# Patient Record
Sex: FEMALE | Race: BLACK OR AFRICAN AMERICAN | Employment: OTHER | URBAN - METROPOLITAN AREA
[De-identification: names, ages, dates, MRNs, and addresses within clinical notes are randomized per-mention and may not be internally consistent; named-entity substitution may affect disease eponyms.]

---

## 2017-01-16 RX ORDER — AMLODIPINE BESYLATE 10 MG/1
TABLET ORAL
Qty: 90 TABLET | Refills: 0 | Status: SHIPPED | OUTPATIENT
Start: 2017-01-16 | End: 2017-05-05 | Stop reason: SDUPTHER

## 2017-01-24 ENCOUNTER — NURSE ONLY (OUTPATIENT)
Dept: FAMILY MEDICINE CLINIC | Age: 57
End: 2017-01-24

## 2017-01-24 VITALS — SYSTOLIC BLOOD PRESSURE: 140 MMHG | DIASTOLIC BLOOD PRESSURE: 60 MMHG

## 2017-01-24 DIAGNOSIS — I10 HYPERTENSION, BENIGN: Primary | ICD-10-CM

## 2017-01-30 RX ORDER — ATORVASTATIN CALCIUM 10 MG/1
TABLET, FILM COATED ORAL
Qty: 90 TABLET | Refills: 0 | Status: SHIPPED | OUTPATIENT
Start: 2017-01-30 | End: 2017-05-05 | Stop reason: SDUPTHER

## 2017-03-06 RX ORDER — POTASSIUM CHLORIDE 600 MG/1
CAPSULE, EXTENDED RELEASE ORAL
Qty: 60 CAPSULE | Refills: 6 | Status: SHIPPED | OUTPATIENT
Start: 2017-03-06 | End: 2017-10-14 | Stop reason: SDUPTHER

## 2017-05-08 RX ORDER — AMLODIPINE BESYLATE 10 MG/1
TABLET ORAL
Qty: 90 TABLET | Refills: 0 | Status: SHIPPED | OUTPATIENT
Start: 2017-05-08 | End: 2017-08-08 | Stop reason: SDUPTHER

## 2017-05-08 RX ORDER — ATORVASTATIN CALCIUM 10 MG/1
TABLET, FILM COATED ORAL
Qty: 90 TABLET | Refills: 0 | Status: SHIPPED | OUTPATIENT
Start: 2017-05-08 | End: 2017-08-08 | Stop reason: SDUPTHER

## 2017-06-19 RX ORDER — EMPAGLIFLOZIN 10 MG/1
TABLET, FILM COATED ORAL
Qty: 30 TABLET | Refills: 0 | Status: SHIPPED | OUTPATIENT
Start: 2017-06-19 | End: 2017-06-19 | Stop reason: SDUPTHER

## 2017-06-26 RX ORDER — PEN NEEDLE, DIABETIC 32GX 5/32"
NEEDLE, DISPOSABLE MISCELLANEOUS
Qty: 100 PACKAGE | Refills: 1 | Status: SHIPPED | OUTPATIENT
Start: 2017-06-26 | End: 2022-09-09

## 2017-06-27 ENCOUNTER — OFFICE VISIT (OUTPATIENT)
Dept: FAMILY MEDICINE CLINIC | Age: 57
End: 2017-06-27

## 2017-06-27 VITALS
HEART RATE: 78 BPM | DIASTOLIC BLOOD PRESSURE: 90 MMHG | BODY MASS INDEX: 32.4 KG/M2 | HEIGHT: 66 IN | SYSTOLIC BLOOD PRESSURE: 122 MMHG | WEIGHT: 201.6 LBS

## 2017-06-27 DIAGNOSIS — E78.2 MIXED HYPERLIPIDEMIA: ICD-10-CM

## 2017-06-27 DIAGNOSIS — I10 HYPERTENSION, BENIGN: ICD-10-CM

## 2017-06-27 DIAGNOSIS — R79.89 ABNORMAL LFTS: ICD-10-CM

## 2017-06-27 DIAGNOSIS — E11.9 TYPE 2 DIABETES MELLITUS WITHOUT COMPLICATION, WITHOUT LONG-TERM CURRENT USE OF INSULIN (HCC): Primary | ICD-10-CM

## 2017-06-27 LAB
HCT VFR BLD CALC: 46.8 % (ref 36–48)
HEMOGLOBIN: 15.3 G/DL (ref 12–16)
MCH RBC QN AUTO: 31.8 PG (ref 26–34)
MCHC RBC AUTO-ENTMCNC: 32.8 G/DL (ref 31–36)
MCV RBC AUTO: 96.9 FL (ref 80–100)
PDW BLD-RTO: 12.7 % (ref 12.4–15.4)
PLATELET # BLD: 186 K/UL (ref 135–450)
PMV BLD AUTO: 9.8 FL (ref 5–10.5)
RBC # BLD: 4.83 M/UL (ref 4–5.2)
WBC # BLD: 6.4 K/UL (ref 4–11)

## 2017-06-27 PROCEDURE — 99214 OFFICE O/P EST MOD 30 MIN: CPT | Performed by: FAMILY MEDICINE

## 2017-06-27 ASSESSMENT — PATIENT HEALTH QUESTIONNAIRE - PHQ9
SUM OF ALL RESPONSES TO PHQ9 QUESTIONS 1 & 2: 0
SUM OF ALL RESPONSES TO PHQ QUESTIONS 1-9: 0
1. LITTLE INTEREST OR PLEASURE IN DOING THINGS: 0
2. FEELING DOWN, DEPRESSED OR HOPELESS: 0

## 2017-06-28 LAB
ALBUMIN SERPL-MCNC: 4.6 G/DL (ref 3.4–5)
ALP BLD-CCNC: 92 U/L (ref 40–129)
ALT SERPL-CCNC: 57 U/L (ref 10–40)
ANION GAP SERPL CALCULATED.3IONS-SCNC: 16 MMOL/L (ref 3–16)
AST SERPL-CCNC: 46 U/L (ref 15–37)
BILIRUB SERPL-MCNC: 0.8 MG/DL (ref 0–1)
BILIRUBIN DIRECT: <0.2 MG/DL (ref 0–0.3)
BILIRUBIN, INDIRECT: ABNORMAL MG/DL (ref 0–1)
BUN BLDV-MCNC: 17 MG/DL (ref 7–20)
CALCIUM SERPL-MCNC: 10.1 MG/DL (ref 8.3–10.6)
CHLORIDE BLD-SCNC: 98 MMOL/L (ref 99–110)
CHOLESTEROL, TOTAL: 160 MG/DL (ref 0–199)
CO2: 27 MMOL/L (ref 21–32)
CREAT SERPL-MCNC: 0.7 MG/DL (ref 0.6–1.1)
ESTIMATED AVERAGE GLUCOSE: 159.9 MG/DL
GFR AFRICAN AMERICAN: >60
GFR NON-AFRICAN AMERICAN: >60
GLUCOSE BLD-MCNC: 132 MG/DL (ref 70–99)
HBA1C MFR BLD: 7.2 %
HDLC SERPL-MCNC: 79 MG/DL (ref 40–60)
LDL CHOLESTEROL CALCULATED: 65 MG/DL
POTASSIUM SERPL-SCNC: 4 MMOL/L (ref 3.5–5.1)
SODIUM BLD-SCNC: 141 MMOL/L (ref 136–145)
TOTAL PROTEIN: 8.5 G/DL (ref 6.4–8.2)
TRIGL SERPL-MCNC: 80 MG/DL (ref 0–150)
TSH SERPL DL<=0.05 MIU/L-ACNC: 1.76 UIU/ML (ref 0.27–4.2)
VLDLC SERPL CALC-MCNC: 16 MG/DL

## 2017-06-29 DIAGNOSIS — R79.89 ABNORMAL LFTS: Primary | ICD-10-CM

## 2017-07-03 RX ORDER — HYDROCHLOROTHIAZIDE 25 MG/1
TABLET ORAL
Qty: 90 TABLET | Refills: 1 | Status: SHIPPED | OUTPATIENT
Start: 2017-07-03 | End: 2017-12-28 | Stop reason: SDUPTHER

## 2017-07-07 ENCOUNTER — HOSPITAL ENCOUNTER (OUTPATIENT)
Dept: ULTRASOUND IMAGING | Age: 57
Discharge: HOME | End: 2017-07-07
Attending: FAMILY MEDICINE | Admitting: FAMILY MEDICINE

## 2017-07-07 DIAGNOSIS — R79.89 ABNORMAL LFTS: ICD-10-CM

## 2017-07-19 LAB
ALBUMIN SERPL-MCNC: 4.6 G/DL
ALP BLD-CCNC: 96 U/L
ALT SERPL-CCNC: 56 U/L
ANION GAP SERPL CALCULATED.3IONS-SCNC: NORMAL MMOL/L
AST SERPL-CCNC: 46 U/L
BASOPHILS ABSOLUTE: NORMAL /ΜL
BASOPHILS RELATIVE PERCENT: NORMAL %
BILIRUB SERPL-MCNC: 0.7 MG/DL (ref 0.1–1.4)
BUN BLDV-MCNC: 13 MG/DL
CALCIUM SERPL-MCNC: 9.5 MG/DL
CHLORIDE BLD-SCNC: 95 MMOL/L
CHOLESTEROL, TOTAL: 145 MG/DL
CHOLESTEROL/HDL RATIO: ABNORMAL
CO2: NORMAL MMOL/L
CREAT SERPL-MCNC: 0.73 MG/DL
EOSINOPHILS ABSOLUTE: NORMAL /ΜL
EOSINOPHILS RELATIVE PERCENT: NORMAL %
GFR CALCULATED: NORMAL
GLUCOSE BLD-MCNC: 117 MG/DL
HCT VFR BLD CALC: 45.5 % (ref 36–46)
HDLC SERPL-MCNC: 72 MG/DL (ref 35–70)
HEMOGLOBIN: 14.9 G/DL (ref 12–16)
LDL CHOLESTEROL CALCULATED: 60 MG/DL (ref 0–160)
LYMPHOCYTES ABSOLUTE: NORMAL /ΜL
LYMPHOCYTES RELATIVE PERCENT: NORMAL %
MCH RBC QN AUTO: NORMAL PG
MCHC RBC AUTO-ENTMCNC: NORMAL G/DL
MCV RBC AUTO: NORMAL FL
MONOCYTES ABSOLUTE: NORMAL /ΜL
MONOCYTES RELATIVE PERCENT: NORMAL %
NEUTROPHILS ABSOLUTE: NORMAL /ΜL
NEUTROPHILS RELATIVE PERCENT: NORMAL %
PLATELET # BLD: 251 K/ΜL
PMV BLD AUTO: NORMAL FL
POTASSIUM SERPL-SCNC: 3.9 MMOL/L
RBC # BLD: 4.85 10^6/ΜL
SODIUM BLD-SCNC: 139 MMOL/L
TOTAL PROTEIN: 8.2
TRIGL SERPL-MCNC: 64 MG/DL
VLDLC SERPL CALC-MCNC: ABNORMAL MG/DL
WBC # BLD: 7.4 10^3/ML

## 2017-08-08 RX ORDER — AMLODIPINE BESYLATE 10 MG/1
TABLET ORAL
Qty: 90 TABLET | Refills: 0 | Status: SHIPPED | OUTPATIENT
Start: 2017-08-08 | End: 2017-09-05 | Stop reason: SDUPTHER

## 2017-08-08 RX ORDER — ATORVASTATIN CALCIUM 10 MG/1
TABLET, FILM COATED ORAL
Qty: 90 TABLET | Refills: 0 | Status: SHIPPED | OUTPATIENT
Start: 2017-08-08 | End: 2017-09-05 | Stop reason: SDUPTHER

## 2017-09-07 RX ORDER — AMLODIPINE BESYLATE 10 MG/1
TABLET ORAL
Qty: 90 TABLET | Refills: 0 | Status: SHIPPED | OUTPATIENT
Start: 2017-09-07 | End: 2017-11-24 | Stop reason: SDUPTHER

## 2017-09-07 RX ORDER — ATORVASTATIN CALCIUM 10 MG/1
TABLET, FILM COATED ORAL
Qty: 90 TABLET | Refills: 0 | Status: SHIPPED | OUTPATIENT
Start: 2017-09-07 | End: 2017-11-24 | Stop reason: SDUPTHER

## 2017-09-14 RX ORDER — EMPAGLIFLOZIN 10 MG/1
TABLET, FILM COATED ORAL
Qty: 90 TABLET | Refills: 0 | Status: SHIPPED | OUTPATIENT
Start: 2017-09-14 | End: 2017-12-14 | Stop reason: SDUPTHER

## 2017-10-17 RX ORDER — POTASSIUM CHLORIDE 600 MG/1
CAPSULE, EXTENDED RELEASE ORAL
Qty: 60 CAPSULE | Refills: 6 | Status: SHIPPED | OUTPATIENT
Start: 2017-10-17 | End: 2018-01-15 | Stop reason: SDUPTHER

## 2017-11-28 RX ORDER — AMLODIPINE BESYLATE 10 MG/1
TABLET ORAL
Qty: 90 TABLET | Refills: 0 | Status: SHIPPED | OUTPATIENT
Start: 2017-11-28 | End: 2018-06-20

## 2017-11-28 RX ORDER — ATORVASTATIN CALCIUM 10 MG/1
TABLET, FILM COATED ORAL
Qty: 90 TABLET | Refills: 0 | Status: SHIPPED | OUTPATIENT
Start: 2017-11-28 | End: 2018-06-20

## 2017-12-15 RX ORDER — EMPAGLIFLOZIN 10 MG/1
TABLET, FILM COATED ORAL
Qty: 90 TABLET | Refills: 0 | Status: SHIPPED | OUTPATIENT
Start: 2017-12-15 | End: 2018-03-31 | Stop reason: SDUPTHER

## 2017-12-28 RX ORDER — HYDROCHLOROTHIAZIDE 25 MG/1
TABLET ORAL
Qty: 90 TABLET | Refills: 1 | Status: SHIPPED | OUTPATIENT
Start: 2017-12-28 | End: 2018-06-25 | Stop reason: SDUPTHER

## 2017-12-29 ENCOUNTER — HOSPITAL ENCOUNTER (OUTPATIENT)
Dept: MAMMOGRAPHY | Age: 57
Discharge: HOME | End: 2017-12-29
Attending: FAMILY MEDICINE | Admitting: FAMILY MEDICINE

## 2017-12-29 DIAGNOSIS — Z12.31 VISIT FOR SCREENING MAMMOGRAM: ICD-10-CM

## 2018-01-15 RX ORDER — POTASSIUM CHLORIDE 600 MG/1
8 CAPSULE, EXTENDED RELEASE ORAL 2 TIMES DAILY
Qty: 60 CAPSULE | Refills: 6 | Status: SHIPPED | OUTPATIENT
Start: 2018-01-15 | End: 2018-09-27 | Stop reason: SDUPTHER

## 2018-02-13 ENCOUNTER — OFFICE VISIT (OUTPATIENT)
Dept: FAMILY MEDICINE CLINIC | Age: 58
End: 2018-02-13

## 2018-02-13 VITALS
HEART RATE: 97 BPM | WEIGHT: 199.6 LBS | BODY MASS INDEX: 32.22 KG/M2 | OXYGEN SATURATION: 99 % | SYSTOLIC BLOOD PRESSURE: 136 MMHG | DIASTOLIC BLOOD PRESSURE: 76 MMHG | RESPIRATION RATE: 14 BRPM

## 2018-02-13 DIAGNOSIS — E78.2 MIXED HYPERLIPIDEMIA: ICD-10-CM

## 2018-02-13 DIAGNOSIS — E11.9 TYPE 2 DIABETES MELLITUS WITHOUT COMPLICATION, WITHOUT LONG-TERM CURRENT USE OF INSULIN (HCC): Primary | ICD-10-CM

## 2018-02-13 DIAGNOSIS — I10 HYPERTENSION, BENIGN: ICD-10-CM

## 2018-02-13 DIAGNOSIS — F32.9 REACTIVE DEPRESSION: ICD-10-CM

## 2018-02-13 DIAGNOSIS — E11.9 TYPE 2 DIABETES MELLITUS WITHOUT COMPLICATION, WITHOUT LONG-TERM CURRENT USE OF INSULIN (HCC): ICD-10-CM

## 2018-02-13 LAB
ALBUMIN SERPL-MCNC: 4.6 G/DL (ref 3.4–5)
ALP BLD-CCNC: 77 U/L (ref 40–129)
ALT SERPL-CCNC: 46 U/L (ref 10–40)
ANION GAP SERPL CALCULATED.3IONS-SCNC: 15 MMOL/L (ref 3–16)
AST SERPL-CCNC: 46 U/L (ref 15–37)
BILIRUB SERPL-MCNC: 0.8 MG/DL (ref 0–1)
BILIRUBIN DIRECT: <0.2 MG/DL (ref 0–0.3)
BILIRUBIN, INDIRECT: ABNORMAL MG/DL (ref 0–1)
BUN BLDV-MCNC: 14 MG/DL (ref 7–20)
CALCIUM SERPL-MCNC: 9.7 MG/DL (ref 8.3–10.6)
CHLORIDE BLD-SCNC: 100 MMOL/L (ref 99–110)
CHOLESTEROL, TOTAL: 167 MG/DL (ref 0–199)
CO2: 27 MMOL/L (ref 21–32)
CREAT SERPL-MCNC: 0.6 MG/DL (ref 0.6–1.1)
GFR AFRICAN AMERICAN: >60
GFR NON-AFRICAN AMERICAN: >60
GLUCOSE BLD-MCNC: 110 MG/DL (ref 70–99)
HCT VFR BLD CALC: 43.1 % (ref 36–48)
HDLC SERPL-MCNC: 79 MG/DL (ref 40–60)
HEMOGLOBIN: 14.8 G/DL (ref 12–16)
LDL CHOLESTEROL CALCULATED: 74 MG/DL
MCH RBC QN AUTO: 32.2 PG (ref 26–34)
MCHC RBC AUTO-ENTMCNC: 34.5 G/DL (ref 31–36)
MCV RBC AUTO: 93.4 FL (ref 80–100)
PDW BLD-RTO: 13.1 % (ref 12.4–15.4)
PLATELET # BLD: 199 K/UL (ref 135–450)
PMV BLD AUTO: 9.8 FL (ref 5–10.5)
POTASSIUM SERPL-SCNC: 3.9 MMOL/L (ref 3.5–5.1)
RBC # BLD: 4.61 M/UL (ref 4–5.2)
SODIUM BLD-SCNC: 142 MMOL/L (ref 136–145)
TOTAL PROTEIN: 8.2 G/DL (ref 6.4–8.2)
TRIGL SERPL-MCNC: 72 MG/DL (ref 0–150)
TSH SERPL DL<=0.05 MIU/L-ACNC: 2.24 UIU/ML (ref 0.27–4.2)
VLDLC SERPL CALC-MCNC: 14 MG/DL
WBC # BLD: 5.8 K/UL (ref 4–11)

## 2018-02-13 PROCEDURE — 99214 OFFICE O/P EST MOD 30 MIN: CPT | Performed by: FAMILY MEDICINE

## 2018-02-13 NOTE — PROGRESS NOTES
complication, without long-term current use of insulin (HCC)  Basic Metabolic Panel    Lipid Panel    Hepatic Function Panel    Hemoglobin A1C     DIABETES EYE EXAM    Ambulatory referral to Ophthalmology     DIABETES FOOT EXAM   2. Reactive depression     3. Hypertension, benign  CBC    TSH without Reflex   4.  Mixed hyperlipidemia             Plan:      See orders, doing well continue the same   Discussed healthier life style, exercise  Will work on PA for the current med's she is on

## 2018-02-14 LAB
ESTIMATED AVERAGE GLUCOSE: 148.5 MG/DL
HBA1C MFR BLD: 6.8 %

## 2018-02-14 RX ORDER — INSULIN GLARGINE 100 [IU]/ML
INJECTION, SOLUTION SUBCUTANEOUS
Qty: 15 PEN | Refills: 0 | Status: SHIPPED | OUTPATIENT
Start: 2018-02-14 | End: 2018-11-19 | Stop reason: SDUPTHER

## 2018-02-22 RX ORDER — AMLODIPINE BESYLATE 10 MG/1
TABLET ORAL
Qty: 90 TABLET | Refills: 0 | Status: SHIPPED | OUTPATIENT
Start: 2018-02-22 | End: 2020-07-10

## 2018-02-22 RX ORDER — ATORVASTATIN CALCIUM 10 MG/1
TABLET, FILM COATED ORAL
Qty: 90 TABLET | Refills: 0 | Status: SHIPPED | OUTPATIENT
Start: 2018-02-22 | End: 2021-10-11

## 2018-03-01 ENCOUNTER — OFFICE VISIT (OUTPATIENT)
Dept: FAMILY MEDICINE CLINIC | Age: 58
End: 2018-03-01

## 2018-03-01 VITALS
WEIGHT: 196 LBS | RESPIRATION RATE: 16 BRPM | SYSTOLIC BLOOD PRESSURE: 130 MMHG | DIASTOLIC BLOOD PRESSURE: 82 MMHG | HEIGHT: 66 IN | BODY MASS INDEX: 31.5 KG/M2 | OXYGEN SATURATION: 98 % | HEART RATE: 106 BPM

## 2018-03-01 DIAGNOSIS — Z01.419 ENCOUNTER FOR ANNUAL ROUTINE GYNECOLOGICAL EXAMINATION: Primary | ICD-10-CM

## 2018-03-01 PROCEDURE — 99396 PREV VISIT EST AGE 40-64: CPT | Performed by: FAMILY MEDICINE

## 2018-04-02 RX ORDER — EMPAGLIFLOZIN 10 MG/1
TABLET, FILM COATED ORAL
Qty: 90 TABLET | Refills: 0 | Status: SHIPPED | OUTPATIENT
Start: 2018-04-02 | End: 2018-04-03

## 2018-04-03 ENCOUNTER — TELEPHONE (OUTPATIENT)
Dept: FAMILY MEDICINE CLINIC | Age: 58
End: 2018-04-03

## 2018-04-24 RX ORDER — CANAGLIFLOZIN 100 MG/1
100 TABLET, FILM COATED ORAL
Qty: 30 TABLET | Refills: 0 | Status: SHIPPED | OUTPATIENT
Start: 2018-04-24 | End: 2018-05-27 | Stop reason: SDUPTHER

## 2018-05-29 RX ORDER — CANAGLIFLOZIN 100 MG/1
100 TABLET, FILM COATED ORAL
Qty: 30 TABLET | Refills: 0 | Status: SHIPPED | OUTPATIENT
Start: 2018-05-29 | End: 2018-06-26 | Stop reason: SDUPTHER

## 2018-06-20 ENCOUNTER — OFFICE VISIT (OUTPATIENT)
Dept: FAMILY MEDICINE CLINIC | Age: 58
End: 2018-06-20

## 2018-06-20 VITALS
TEMPERATURE: 98.4 F | HEART RATE: 109 BPM | BODY MASS INDEX: 32.28 KG/M2 | DIASTOLIC BLOOD PRESSURE: 68 MMHG | OXYGEN SATURATION: 98 % | SYSTOLIC BLOOD PRESSURE: 134 MMHG | WEIGHT: 200 LBS

## 2018-06-20 DIAGNOSIS — R79.89 ABNORMAL LFTS: ICD-10-CM

## 2018-06-20 DIAGNOSIS — K76.0 FATTY LIVER: ICD-10-CM

## 2018-06-20 DIAGNOSIS — E11.9 TYPE 2 DIABETES MELLITUS WITHOUT COMPLICATION, WITHOUT LONG-TERM CURRENT USE OF INSULIN (HCC): ICD-10-CM

## 2018-06-20 DIAGNOSIS — R11.0 NAUSEA: ICD-10-CM

## 2018-06-20 DIAGNOSIS — R19.5 CHANGE IN STOOL: Primary | ICD-10-CM

## 2018-06-20 LAB
A/G RATIO: 1.3 (ref 1.1–2.2)
ALBUMIN SERPL-MCNC: 4.7 G/DL (ref 3.4–5)
ALP BLD-CCNC: 96 U/L (ref 40–129)
ALT SERPL-CCNC: 45 U/L (ref 10–40)
ANION GAP SERPL CALCULATED.3IONS-SCNC: 18 MMOL/L (ref 3–16)
AST SERPL-CCNC: 60 U/L (ref 15–37)
BASOPHILS ABSOLUTE: 0.1 K/UL (ref 0–0.2)
BASOPHILS RELATIVE PERCENT: 0.9 %
BILIRUB SERPL-MCNC: 0.7 MG/DL (ref 0–1)
BUN BLDV-MCNC: 13 MG/DL (ref 7–20)
CALCIUM SERPL-MCNC: 10 MG/DL (ref 8.3–10.6)
CHLORIDE BLD-SCNC: 99 MMOL/L (ref 99–110)
CO2: 26 MMOL/L (ref 21–32)
CREAT SERPL-MCNC: 0.6 MG/DL (ref 0.6–1.1)
CREATININE URINE: 47.6 MG/DL (ref 28–259)
EOSINOPHILS ABSOLUTE: 0.1 K/UL (ref 0–0.6)
EOSINOPHILS RELATIVE PERCENT: 1.3 %
GFR AFRICAN AMERICAN: >60
GFR NON-AFRICAN AMERICAN: >60
GLOBULIN: 3.7 G/DL
GLUCOSE BLD-MCNC: 153 MG/DL (ref 70–99)
HCT VFR BLD CALC: 44 % (ref 36–48)
HEMOGLOBIN: 15 G/DL (ref 12–16)
LYMPHOCYTES ABSOLUTE: 1.8 K/UL (ref 1–5.1)
LYMPHOCYTES RELATIVE PERCENT: 27.5 %
MCH RBC QN AUTO: 32.2 PG (ref 26–34)
MCHC RBC AUTO-ENTMCNC: 34.1 G/DL (ref 31–36)
MCV RBC AUTO: 94.4 FL (ref 80–100)
MICROALBUMIN UR-MCNC: <1.2 MG/DL
MICROALBUMIN/CREAT UR-RTO: NORMAL MG/G (ref 0–30)
MONOCYTES ABSOLUTE: 0.5 K/UL (ref 0–1.3)
MONOCYTES RELATIVE PERCENT: 7.4 %
NEUTROPHILS ABSOLUTE: 4.2 K/UL (ref 1.7–7.7)
NEUTROPHILS RELATIVE PERCENT: 62.9 %
PDW BLD-RTO: 12.5 % (ref 12.4–15.4)
PLATELET # BLD: 198 K/UL (ref 135–450)
PMV BLD AUTO: 9.4 FL (ref 5–10.5)
POTASSIUM SERPL-SCNC: 3.8 MMOL/L (ref 3.5–5.1)
RBC # BLD: 4.66 M/UL (ref 4–5.2)
SODIUM BLD-SCNC: 143 MMOL/L (ref 136–145)
TOTAL PROTEIN: 8.4 G/DL (ref 6.4–8.2)
WBC # BLD: 6.6 K/UL (ref 4–11)

## 2018-06-20 PROCEDURE — 99214 OFFICE O/P EST MOD 30 MIN: CPT | Performed by: FAMILY MEDICINE

## 2018-06-20 ASSESSMENT — ENCOUNTER SYMPTOMS
VOMITING: 0
CONSTIPATION: 0
PHOTOPHOBIA: 0
NAUSEA: 0
TROUBLE SWALLOWING: 0
EYE DISCHARGE: 0
CHOKING: 0
APNEA: 0
CHEST TIGHTNESS: 0
DIARRHEA: 0
COLOR CHANGE: 0
EYE REDNESS: 0
ABDOMINAL PAIN: 0
SORE THROAT: 0
EYE PAIN: 0
BLOOD IN STOOL: 0
COUGH: 0
SINUS PAIN: 0
BACK PAIN: 0
ABDOMINAL DISTENTION: 0
SINUS PRESSURE: 0
RHINORRHEA: 0
VOICE CHANGE: 0
SHORTNESS OF BREATH: 0
WHEEZING: 0
EYE ITCHING: 0

## 2018-06-21 PROBLEM — K76.0 FATTY LIVER: Status: ACTIVE | Noted: 2018-06-21

## 2018-06-22 DIAGNOSIS — R19.5 CHANGE IN STOOL: ICD-10-CM

## 2018-06-23 LAB
CRYPTOSPORIDIUM ANTIGEN STOOL: NORMAL
E HISTOLYTICA ANTIGEN STOOL: NORMAL
GIARDIA ANTIGEN STOOL: NORMAL

## 2018-06-25 RX ORDER — HYDROCHLOROTHIAZIDE 25 MG/1
TABLET ORAL
Qty: 90 TABLET | Refills: 1 | Status: SHIPPED | OUTPATIENT
Start: 2018-06-25 | End: 2018-12-17 | Stop reason: SDUPTHER

## 2018-06-26 RX ORDER — CANAGLIFLOZIN 100 MG/1
TABLET, FILM COATED ORAL
Qty: 90 TABLET | Refills: 0 | Status: SHIPPED | OUTPATIENT
Start: 2018-06-26 | End: 2018-10-02 | Stop reason: SDUPTHER

## 2018-07-10 LAB
ALBUMIN SERPL-MCNC: 4.4 G/DL
ALP BLD-CCNC: 79 U/L
ALT SERPL-CCNC: 46 U/L
ANION GAP SERPL CALCULATED.3IONS-SCNC: NORMAL MMOL/L
AST SERPL-CCNC: 53 U/L
AVERAGE GLUCOSE: NORMAL
BASOPHILS ABSOLUTE: NORMAL /ΜL
BASOPHILS RELATIVE PERCENT: NORMAL %
BILIRUB SERPL-MCNC: 0.7 MG/DL (ref 0.1–1.4)
BUN BLDV-MCNC: 11 MG/DL
CALCIUM SERPL-MCNC: 9.2 MG/DL
CHLORIDE BLD-SCNC: 100 MMOL/L
CHOLESTEROL, TOTAL: 138 MG/DL
CHOLESTEROL/HDL RATIO: 2
CO2: NORMAL MMOL/L
CREAT SERPL-MCNC: 0.72 MG/DL
EOSINOPHILS ABSOLUTE: NORMAL /ΜL
EOSINOPHILS RELATIVE PERCENT: NORMAL %
GFR CALCULATED: 92
GLUCOSE BLD-MCNC: 114 MG/DL
HBA1C MFR BLD: 7 %
HCT VFR BLD CALC: 42.6 % (ref 36–46)
HDLC SERPL-MCNC: 68 MG/DL (ref 35–70)
HEMOGLOBIN: 14.7 G/DL (ref 12–16)
LDL CHOLESTEROL CALCULATED: 57 MG/DL (ref 0–160)
LYMPHOCYTES ABSOLUTE: NORMAL /ΜL
LYMPHOCYTES RELATIVE PERCENT: NORMAL %
MCH RBC QN AUTO: 34.5 PG
MCHC RBC AUTO-ENTMCNC: NORMAL G/DL
MCV RBC AUTO: 92 FL
MONOCYTES ABSOLUTE: NORMAL /ΜL
MONOCYTES RELATIVE PERCENT: NORMAL %
NEUTROPHILS ABSOLUTE: NORMAL /ΜL
NEUTROPHILS RELATIVE PERCENT: NORMAL %
PLATELET # BLD: 201 K/ΜL
PMV BLD AUTO: NORMAL FL
POTASSIUM SERPL-SCNC: 3.9 MMOL/L
RBC # BLD: 4.64 10^6/ΜL
SODIUM BLD-SCNC: 137 MMOL/L
TOTAL PROTEIN: 7.9
TRIGL SERPL-MCNC: 64 MG/DL
TSH SERPL DL<=0.05 MIU/L-ACNC: 1.29 UIU/ML
URIC ACID: 4.6
VLDLC SERPL CALC-MCNC: 13 MG/DL
WBC # BLD: NORMAL 10^3/ML

## 2018-08-17 RX ORDER — ATORVASTATIN CALCIUM 10 MG/1
TABLET, FILM COATED ORAL
Qty: 90 TABLET | Refills: 0 | Status: SHIPPED | OUTPATIENT
Start: 2018-08-17 | End: 2018-11-15 | Stop reason: SDUPTHER

## 2018-08-17 RX ORDER — AMLODIPINE BESYLATE 10 MG/1
TABLET ORAL
Qty: 90 TABLET | Refills: 0 | Status: SHIPPED | OUTPATIENT
Start: 2018-08-17 | End: 2018-11-04 | Stop reason: SDUPTHER

## 2018-08-17 NOTE — TELEPHONE ENCOUNTER
Medication:   Requested Prescriptions     Pending Prescriptions Disp Refills    atorvastatin (LIPITOR) 10 MG tablet [Pharmacy Med Name: ATORVASTATIN 10 MG TABLET] 90 tablet 0     Sig: TAKE 1 TABLET BY MOUTH EVERY DAY    amLODIPine (NORVASC) 10 MG tablet [Pharmacy Med Name: AMLODIPINE BESYLATE 10 MG TAB] 90 tablet 0     Sig: TAKE 1 TABLET BY MOUTH EVERY DAY     Last Filled:  5.20.18    Last appt: 6/20/2018   Next appt: Visit date not found    Last Lipid:   Lab Results   Component Value Date    CHOL 138 07/10/2018    TRIG 64 07/10/2018    HDL 68 07/10/2018    LDLCALC 57 07/10/2018

## 2018-09-27 RX ORDER — POTASSIUM CHLORIDE 600 MG/1
8 CAPSULE, EXTENDED RELEASE ORAL 2 TIMES DAILY
Qty: 60 CAPSULE | Refills: 1 | Status: SHIPPED | OUTPATIENT
Start: 2018-09-27 | End: 2018-10-23 | Stop reason: SDUPTHER

## 2018-09-27 NOTE — TELEPHONE ENCOUNTER
Medication:   Requested Prescriptions     Pending Prescriptions Disp Refills    Potassium Chloride ER 8 MEQ CPCR [Pharmacy Med Name: POTASSIUM CL ER 8 MEQ CAPSULE] 60 capsule 1     Sig: TAKE 1 CAPSULE BY MOUTH 2 TIMES DAILY     Last Filled:  6.29.18    Last appt: 6/20/2018   Next appt: Visit date not found

## 2018-10-04 RX ORDER — CANAGLIFLOZIN 100 MG/1
TABLET, FILM COATED ORAL
Qty: 90 TABLET | Refills: 0 | Status: SHIPPED | OUTPATIENT
Start: 2018-10-04 | End: 2019-01-01 | Stop reason: SDUPTHER

## 2018-10-23 RX ORDER — POTASSIUM CHLORIDE 600 MG/1
8 CAPSULE, EXTENDED RELEASE ORAL 2 TIMES DAILY
Qty: 60 CAPSULE | Refills: 1 | Status: SHIPPED | OUTPATIENT
Start: 2018-10-23 | End: 2018-11-26 | Stop reason: SDUPTHER

## 2018-10-23 NOTE — TELEPHONE ENCOUNTER
Medication:   Requested Prescriptions     Pending Prescriptions Disp Refills    Potassium Chloride ER 8 MEQ CPCR 60 capsule 1     Sig: Take 1 capsule by mouth 2 times daily     Last Filled:  9/27/18    Last appt: 3/1/18  Next appt: Visit date not found    Last Labs DM:   Lab Results   Component Value Date    LABA1C 7.0 07/10/2018     Last Lipid:   Lab Results   Component Value Date    CHOL 138 07/10/2018    TRIG 64 07/10/2018    HDL 68 07/10/2018    LDLCALC 57 07/10/2018     Last PSA: No results found for: PSA  Last Thyroid:   Lab Results   Component Value Date    TSH 1.290 07/10/2018

## 2018-11-05 RX ORDER — AMLODIPINE BESYLATE 10 MG/1
TABLET ORAL
Qty: 90 TABLET | Refills: 0 | Status: SHIPPED | OUTPATIENT
Start: 2018-11-05 | End: 2019-01-18

## 2018-11-05 NOTE — TELEPHONE ENCOUNTER
Medication:   Requested Prescriptions     Pending Prescriptions Disp Refills    amLODIPine (NORVASC) 10 MG tablet [Pharmacy Med Name: AMLODIPINE BESYLATE 10 MG TAB] 90 tablet 0     Sig: TAKE 1 TABLET BY MOUTH EVERY DAY     Last Filled:  8.17.18    Last appt: 6/20/2018   Next appt: Visit date not found

## 2018-11-15 RX ORDER — ATORVASTATIN CALCIUM 10 MG/1
TABLET, FILM COATED ORAL
Qty: 90 TABLET | Refills: 0 | Status: SHIPPED | OUTPATIENT
Start: 2018-11-15 | End: 2019-01-18

## 2018-11-15 NOTE — TELEPHONE ENCOUNTER
Medication:   Requested Prescriptions     Pending Prescriptions Disp Refills    atorvastatin (LIPITOR) 10 MG tablet [Pharmacy Med Name: ATORVASTATIN 10 MG TABLET] 90 tablet 0     Sig: TAKE 1 TABLET BY MOUTH EVERY DAY     Last Filled:  8/17/18    Last appt: 6/20/2018   Next appt: Visit date not found    Last Lipid:   Lab Results   Component Value Date    CHOL 138 07/10/2018    TRIG 64 07/10/2018    HDL 68 07/10/2018    LDLCALC 57 07/10/2018

## 2018-11-21 RX ORDER — EPINEPHRINE 0.3 MG/.3ML
0.3 INJECTION SUBCUTANEOUS ONCE
Qty: 0.3 ML | Refills: 0 | Status: SHIPPED | OUTPATIENT
Start: 2018-11-21 | End: 2022-02-01

## 2018-11-26 NOTE — TELEPHONE ENCOUNTER
Medication:   Requested Prescriptions     Pending Prescriptions Disp Refills    Potassium Chloride ER 8 MEQ CPCR 180 capsule 1     Sig: Take 1 capsule by mouth 2 times daily     Last Filled:  10/23/18    Last appt: 6/20/18  Next appt: 12/20/2018    Last Labs DM:   Lab Results   Component Value Date    LABA1C 7.0 07/10/2018     Last Lipid:   Lab Results   Component Value Date    CHOL 138 07/10/2018    TRIG 64 07/10/2018    HDL 68 07/10/2018    LDLCALC 57 07/10/2018     Last PSA: No results found for: PSA  Last Thyroid:   Lab Results   Component Value Date    TSH 1.290 07/10/2018

## 2018-11-27 RX ORDER — POTASSIUM CHLORIDE 600 MG/1
8 CAPSULE, EXTENDED RELEASE ORAL 2 TIMES DAILY
Qty: 180 CAPSULE | Refills: 1 | Status: SHIPPED | OUTPATIENT
Start: 2018-11-27 | End: 2019-06-20 | Stop reason: SDUPTHER

## 2018-12-04 ENCOUNTER — NURSE ONLY (OUTPATIENT)
Dept: FAMILY MEDICINE CLINIC | Age: 58
End: 2018-12-04

## 2018-12-04 VITALS — SYSTOLIC BLOOD PRESSURE: 136 MMHG | DIASTOLIC BLOOD PRESSURE: 84 MMHG

## 2018-12-17 RX ORDER — HYDROCHLOROTHIAZIDE 25 MG/1
25 TABLET ORAL DAILY
Qty: 90 TABLET | Refills: 1 | Status: SHIPPED | OUTPATIENT
Start: 2018-12-17 | End: 2019-06-12 | Stop reason: SDUPTHER

## 2018-12-17 NOTE — TELEPHONE ENCOUNTER
Medication:   Requested Prescriptions     Pending Prescriptions Disp Refills    hydrochlorothiazide (HYDRODIURIL) 25 MG tablet 90 tablet 1     Last Filled:  6.25.18    Last appt: 6.20.18   Next appt: 12/20/2018    Last OARRS: No flowsheet data found.

## 2019-01-02 RX ORDER — CANAGLIFLOZIN 100 MG/1
TABLET, FILM COATED ORAL
Qty: 90 TABLET | Refills: 0 | Status: SHIPPED | OUTPATIENT
Start: 2019-01-02 | End: 2020-11-09

## 2019-01-03 ENCOUNTER — HOSPITAL ENCOUNTER (OUTPATIENT)
Dept: MAMMOGRAPHY | Age: 59
Discharge: HOME | End: 2019-01-03
Payer: COMMERCIAL

## 2019-01-03 DIAGNOSIS — Z12.31 VISIT FOR SCREENING MAMMOGRAM: ICD-10-CM

## 2019-01-03 PROCEDURE — 77063 BREAST TOMOSYNTHESIS BI: CPT

## 2019-01-18 ENCOUNTER — OFFICE VISIT (OUTPATIENT)
Dept: FAMILY MEDICINE CLINIC | Age: 59
End: 2019-01-18
Payer: COMMERCIAL

## 2019-01-18 VITALS
OXYGEN SATURATION: 95 % | RESPIRATION RATE: 14 BRPM | BODY MASS INDEX: 32.99 KG/M2 | WEIGHT: 204.4 LBS | HEART RATE: 78 BPM | SYSTOLIC BLOOD PRESSURE: 138 MMHG | DIASTOLIC BLOOD PRESSURE: 80 MMHG

## 2019-01-18 DIAGNOSIS — E11.9 TYPE 2 DIABETES MELLITUS WITHOUT COMPLICATION, WITHOUT LONG-TERM CURRENT USE OF INSULIN (HCC): Primary | ICD-10-CM

## 2019-01-18 DIAGNOSIS — I10 HYPERTENSION, BENIGN: ICD-10-CM

## 2019-01-18 DIAGNOSIS — E78.2 MIXED HYPERLIPIDEMIA: ICD-10-CM

## 2019-01-18 DIAGNOSIS — Z78.0 POSTMENOPAUSAL: ICD-10-CM

## 2019-01-18 LAB
ALBUMIN SERPL-MCNC: 4.7 G/DL (ref 3.4–5)
ALP BLD-CCNC: 86 U/L (ref 40–129)
ALT SERPL-CCNC: 44 U/L (ref 10–40)
ANION GAP SERPL CALCULATED.3IONS-SCNC: 16 MMOL/L (ref 3–16)
AST SERPL-CCNC: 43 U/L (ref 15–37)
BILIRUB SERPL-MCNC: 0.7 MG/DL (ref 0–1)
BILIRUBIN DIRECT: <0.2 MG/DL (ref 0–0.3)
BILIRUBIN, INDIRECT: ABNORMAL MG/DL (ref 0–1)
BUN BLDV-MCNC: 13 MG/DL (ref 7–20)
CALCIUM SERPL-MCNC: 9.7 MG/DL (ref 8.3–10.6)
CHLORIDE BLD-SCNC: 98 MMOL/L (ref 99–110)
CHOLESTEROL, TOTAL: 151 MG/DL (ref 0–199)
CO2: 26 MMOL/L (ref 21–32)
CREAT SERPL-MCNC: 0.7 MG/DL (ref 0.6–1.1)
FOLATE: >20 NG/ML (ref 4.78–24.2)
GFR AFRICAN AMERICAN: >60
GFR NON-AFRICAN AMERICAN: >60
GLUCOSE BLD-MCNC: 120 MG/DL (ref 70–99)
HCT VFR BLD CALC: 43.6 % (ref 36–48)
HDLC SERPL-MCNC: 63 MG/DL (ref 40–60)
HEMOGLOBIN: 14.6 G/DL (ref 12–16)
LDL CHOLESTEROL CALCULATED: 74 MG/DL
MCH RBC QN AUTO: 32.1 PG (ref 26–34)
MCHC RBC AUTO-ENTMCNC: 33.6 G/DL (ref 31–36)
MCV RBC AUTO: 95.4 FL (ref 80–100)
PDW BLD-RTO: 12.9 % (ref 12.4–15.4)
PLATELET # BLD: 206 K/UL (ref 135–450)
PMV BLD AUTO: 9.3 FL (ref 5–10.5)
POTASSIUM SERPL-SCNC: 4.1 MMOL/L (ref 3.5–5.1)
RBC # BLD: 4.56 M/UL (ref 4–5.2)
SODIUM BLD-SCNC: 140 MMOL/L (ref 136–145)
TOTAL PROTEIN: 8.4 G/DL (ref 6.4–8.2)
TRIGL SERPL-MCNC: 71 MG/DL (ref 0–150)
TSH SERPL DL<=0.05 MIU/L-ACNC: 1.53 UIU/ML (ref 0.27–4.2)
VITAMIN B-12: 1102 PG/ML (ref 211–911)
VLDLC SERPL CALC-MCNC: 14 MG/DL
WBC # BLD: 6 K/UL (ref 4–11)

## 2019-01-18 PROCEDURE — 99214 OFFICE O/P EST MOD 30 MIN: CPT | Performed by: FAMILY MEDICINE

## 2019-01-19 LAB
ESTIMATED AVERAGE GLUCOSE: 165.7 MG/DL
HBA1C MFR BLD: 7.4 %

## 2019-02-01 RX ORDER — AMLODIPINE BESYLATE 10 MG/1
TABLET ORAL
Qty: 90 TABLET | Refills: 0 | Status: SHIPPED | OUTPATIENT
Start: 2019-02-01 | End: 2019-08-23

## 2019-02-05 RX ORDER — AMLODIPINE BESYLATE 10 MG/1
TABLET ORAL
Qty: 90 TABLET | Refills: 0 | Status: SHIPPED | OUTPATIENT
Start: 2019-02-05 | End: 2019-05-07 | Stop reason: SDUPTHER

## 2019-02-12 RX ORDER — ATORVASTATIN CALCIUM 10 MG/1
TABLET, FILM COATED ORAL
Qty: 90 TABLET | Refills: 0 | Status: SHIPPED | OUTPATIENT
Start: 2019-02-12 | End: 2019-05-10 | Stop reason: SDUPTHER

## 2019-02-14 RX ORDER — INSULIN GLARGINE 100 [IU]/ML
15 INJECTION, SOLUTION SUBCUTANEOUS NIGHTLY
Qty: 6 PEN | Refills: 0 | Status: SHIPPED | OUTPATIENT
Start: 2019-02-14 | End: 2020-08-26 | Stop reason: SDUPTHER

## 2019-05-07 RX ORDER — AMLODIPINE BESYLATE 10 MG/1
TABLET ORAL
Qty: 90 TABLET | Refills: 0 | Status: SHIPPED | OUTPATIENT
Start: 2019-05-07 | End: 2019-08-03 | Stop reason: SDUPTHER

## 2019-05-07 NOTE — TELEPHONE ENCOUNTER
Medication:   Requested Prescriptions     Pending Prescriptions Disp Refills    amLODIPine (NORVASC) 10 MG tablet [Pharmacy Med Name: AMLODIPINE BESYLATE 10 MG TAB] 90 tablet 0     Sig: TAKE 1 TABLET BY MOUTH EVERY DAY     Last Filled:  2/5/19    Last appt: 1/18/2019   Next appt: Visit date not found    Last Labs DM:   Lab Results   Component Value Date    LABA1C 7.4 01/18/2019     Last Lipid:   Lab Results   Component Value Date    CHOL 151 01/18/2019    TRIG 71 01/18/2019    HDL 63 01/18/2019    LDLCALC 74 01/18/2019     Last PSA: No results found for: PSA  Last Thyroid:   Lab Results   Component Value Date    TSH 1.53 01/18/2019

## 2019-05-10 RX ORDER — ATORVASTATIN CALCIUM 10 MG/1
TABLET, FILM COATED ORAL
Qty: 90 TABLET | Refills: 0 | Status: SHIPPED | OUTPATIENT
Start: 2019-05-10 | End: 2019-08-06 | Stop reason: SDUPTHER

## 2019-05-10 NOTE — TELEPHONE ENCOUNTER
Medication:   Requested Prescriptions     Pending Prescriptions Disp Refills    atorvastatin (LIPITOR) 10 MG tablet [Pharmacy Med Name: ATORVASTATIN 10 MG TABLET] 90 tablet 0     Sig: TAKE 1 TABLET BY MOUTH EVERY DAY     Last Filled:  2/12/19    Last appt: 1/18/19   Next appt: Visit date not found    Last Lipid:   Lab Results   Component Value Date    CHOL 151 01/18/2019    TRIG 71 01/18/2019    HDL 63 01/18/2019    LDLCALC 74 01/18/2019

## 2019-05-24 RX ORDER — INSULIN GLARGINE 100 [IU]/ML
INJECTION, SOLUTION SUBCUTANEOUS
Qty: 15 PEN | Refills: 1 | Status: SHIPPED | OUTPATIENT
Start: 2019-05-24 | End: 2019-08-23

## 2019-05-24 NOTE — TELEPHONE ENCOUNTER
Medication:   Requested Prescriptions     Pending Prescriptions Disp Refills    BASAGLAR KWIKPEN 100 UNIT/ML injection pen [Pharmacy Med Name: Cynthia Doshi 100 UNIT/ML KWIKPEN]  0     Sig: INJECT 15 UNITS INTO THE SKIN NIGHTLY     Last Filled:  2/14//19    Last appt: 1/18/2019   Next appt: Visit date not found    Last Labs DM:   Lab Results   Component Value Date    LABA1C 7.4 01/18/2019

## 2019-06-12 RX ORDER — HYDROCHLOROTHIAZIDE 25 MG/1
TABLET ORAL
Qty: 90 TABLET | Refills: 1 | Status: SHIPPED | OUTPATIENT
Start: 2019-06-12 | End: 2019-12-09 | Stop reason: SDUPTHER

## 2019-06-20 RX ORDER — POTASSIUM CHLORIDE 600 MG/1
CAPSULE, EXTENDED RELEASE ORAL
Qty: 180 CAPSULE | Refills: 0 | Status: SHIPPED | OUTPATIENT
Start: 2019-06-20 | End: 2019-09-16 | Stop reason: SDUPTHER

## 2019-07-01 RX ORDER — EMPAGLIFLOZIN 10 MG/1
TABLET, FILM COATED ORAL
Qty: 90 TABLET | Refills: 1 | Status: SHIPPED | OUTPATIENT
Start: 2019-07-01 | End: 2019-12-13 | Stop reason: SDUPTHER

## 2019-08-05 RX ORDER — AMLODIPINE BESYLATE 10 MG/1
TABLET ORAL
Qty: 90 TABLET | Refills: 0 | Status: SHIPPED | OUTPATIENT
Start: 2019-08-05 | End: 2019-08-23

## 2019-08-06 RX ORDER — ATORVASTATIN CALCIUM 10 MG/1
TABLET, FILM COATED ORAL
Qty: 90 TABLET | Refills: 0 | Status: SHIPPED | OUTPATIENT
Start: 2019-08-06 | End: 2019-08-23

## 2019-08-06 NOTE — TELEPHONE ENCOUNTER
Medication:   Requested Prescriptions     Pending Prescriptions Disp Refills    atorvastatin (LIPITOR) 10 MG tablet [Pharmacy Med Name: ATORVASTATIN 10 MG TABLET] 90 tablet 0     Sig: TAKE 1 TABLET BY MOUTH EVERY DAY     Last Filled:  5/10/19    Last appt: 1/18/2019   Next appt: Visit date not found    Last Lipid:   Lab Results   Component Value Date    CHOL 151 01/18/2019    TRIG 71 01/18/2019    HDL 63 01/18/2019    LDLCALC 74 01/18/2019

## 2019-08-13 LAB
BASOPHILS ABSOLUTE: NORMAL /ΜL
BASOPHILS RELATIVE PERCENT: NORMAL %
BUN BLDV-MCNC: 12 MG/DL
CALCIUM SERPL-MCNC: 9.4 MG/DL
CHLORIDE BLD-SCNC: 103 MMOL/L
CHOLESTEROL, TOTAL: 132 MG/DL
CHOLESTEROL/HDL RATIO: 2.3
CO2: NORMAL MMOL/L
CREAT SERPL-MCNC: 0.79 MG/DL
EOSINOPHILS ABSOLUTE: NORMAL /ΜL
EOSINOPHILS RELATIVE PERCENT: NORMAL %
GFR CALCULATED: NORMAL
GLUCOSE BLD-MCNC: 163 MG/DL
HCT VFR BLD CALC: 43.4 % (ref 36–46)
HDLC SERPL-MCNC: 57 MG/DL (ref 35–70)
HEMOGLOBIN: 14.8 G/DL (ref 12–16)
LDL CHOLESTEROL CALCULATED: 58 MG/DL (ref 0–160)
LYMPHOCYTES ABSOLUTE: NORMAL /ΜL
LYMPHOCYTES RELATIVE PERCENT: NORMAL %
MCH RBC QN AUTO: 31.7 PG
MCHC RBC AUTO-ENTMCNC: 34.1 G/DL
MCV RBC AUTO: 93 FL
MONOCYTES ABSOLUTE: NORMAL /ΜL
MONOCYTES RELATIVE PERCENT: NORMAL %
NEUTROPHILS ABSOLUTE: NORMAL /ΜL
NEUTROPHILS RELATIVE PERCENT: NORMAL %
PLATELET # BLD: 219 K/ΜL
PMV BLD AUTO: NORMAL FL
POTASSIUM SERPL-SCNC: 3.7 MMOL/L
RBC # BLD: 12.8 10^6/ΜL
SODIUM BLD-SCNC: 143 MMOL/L
TRIGL SERPL-MCNC: 86 MG/DL
TSH SERPL DL<=0.05 MIU/L-ACNC: 2.16 UIU/ML
VLDLC SERPL CALC-MCNC: NORMAL MG/DL
WBC # BLD: 6.1 10^3/ML

## 2019-08-23 ENCOUNTER — OFFICE VISIT (OUTPATIENT)
Dept: PRIMARY CARE CLINIC | Age: 59
End: 2019-08-23
Payer: COMMERCIAL

## 2019-08-23 VITALS
BODY MASS INDEX: 32.57 KG/M2 | SYSTOLIC BLOOD PRESSURE: 139 MMHG | OXYGEN SATURATION: 98 % | RESPIRATION RATE: 14 BRPM | DIASTOLIC BLOOD PRESSURE: 90 MMHG | WEIGHT: 201.8 LBS | HEART RATE: 108 BPM

## 2019-08-23 DIAGNOSIS — E78.2 MIXED HYPERLIPIDEMIA: ICD-10-CM

## 2019-08-23 DIAGNOSIS — I10 HYPERTENSION, BENIGN: ICD-10-CM

## 2019-08-23 DIAGNOSIS — F32.9 REACTIVE DEPRESSION: ICD-10-CM

## 2019-08-23 DIAGNOSIS — K76.0 FATTY LIVER: ICD-10-CM

## 2019-08-23 DIAGNOSIS — E11.9 TYPE 2 DIABETES MELLITUS WITHOUT COMPLICATION, WITHOUT LONG-TERM CURRENT USE OF INSULIN (HCC): Primary | ICD-10-CM

## 2019-08-23 PROCEDURE — 99214 OFFICE O/P EST MOD 30 MIN: CPT | Performed by: FAMILY MEDICINE

## 2019-09-16 RX ORDER — POTASSIUM CHLORIDE 600 MG/1
CAPSULE, EXTENDED RELEASE ORAL
Qty: 180 CAPSULE | Refills: 0 | Status: SHIPPED | OUTPATIENT
Start: 2019-09-16 | End: 2019-12-17 | Stop reason: SDUPTHER

## 2019-10-28 RX ORDER — AMLODIPINE BESYLATE 10 MG/1
TABLET ORAL
Qty: 90 TABLET | Refills: 0 | Status: SHIPPED | OUTPATIENT
Start: 2019-10-28 | End: 2020-01-20

## 2019-10-29 RX ORDER — ATORVASTATIN CALCIUM 10 MG/1
TABLET, FILM COATED ORAL
Qty: 90 TABLET | Refills: 0 | Status: SHIPPED | OUTPATIENT
Start: 2019-10-29 | End: 2020-01-28

## 2019-11-26 ENCOUNTER — TELEPHONE (OUTPATIENT)
Dept: PRIMARY CARE CLINIC | Age: 59
End: 2019-11-26

## 2019-12-09 RX ORDER — HYDROCHLOROTHIAZIDE 25 MG/1
TABLET ORAL
Qty: 90 TABLET | Refills: 1 | Status: SHIPPED | OUTPATIENT
Start: 2019-12-09 | End: 2020-06-05

## 2019-12-13 RX ORDER — EMPAGLIFLOZIN 10 MG/1
TABLET, FILM COATED ORAL
Qty: 90 TABLET | Refills: 1 | Status: SHIPPED | OUTPATIENT
Start: 2019-12-13 | End: 2020-06-05

## 2019-12-17 RX ORDER — POTASSIUM CHLORIDE 600 MG/1
CAPSULE, EXTENDED RELEASE ORAL
Qty: 180 CAPSULE | Refills: 0 | Status: SHIPPED | OUTPATIENT
Start: 2019-12-17 | End: 2020-03-12

## 2020-01-06 ENCOUNTER — HOSPITAL ENCOUNTER (OUTPATIENT)
Dept: WOMENS IMAGING | Age: 60
Discharge: HOME | End: 2020-01-06
Payer: COMMERCIAL

## 2020-01-06 PROCEDURE — 77067 SCR MAMMO BI INCL CAD: CPT

## 2020-01-20 RX ORDER — AMLODIPINE BESYLATE 10 MG/1
TABLET ORAL
Qty: 90 TABLET | Refills: 0 | Status: SHIPPED | OUTPATIENT
Start: 2020-01-20 | End: 2020-04-16

## 2020-01-28 RX ORDER — ATORVASTATIN CALCIUM 10 MG/1
TABLET, FILM COATED ORAL
Qty: 90 TABLET | Refills: 0 | Status: SHIPPED | OUTPATIENT
Start: 2020-01-28 | End: 2020-04-23

## 2020-02-24 ENCOUNTER — OFFICE VISIT (OUTPATIENT)
Dept: PRIMARY CARE CLINIC | Age: 60
End: 2020-02-24
Payer: COMMERCIAL

## 2020-02-24 VITALS
OXYGEN SATURATION: 98 % | BODY MASS INDEX: 30.67 KG/M2 | WEIGHT: 190 LBS | DIASTOLIC BLOOD PRESSURE: 84 MMHG | HEART RATE: 102 BPM | RESPIRATION RATE: 12 BRPM | SYSTOLIC BLOOD PRESSURE: 140 MMHG

## 2020-02-24 DIAGNOSIS — I10 HYPERTENSION, BENIGN: ICD-10-CM

## 2020-02-24 DIAGNOSIS — N39.41 URGE INCONTINENCE: ICD-10-CM

## 2020-02-24 DIAGNOSIS — E11.9 TYPE 2 DIABETES MELLITUS WITHOUT COMPLICATION, WITHOUT LONG-TERM CURRENT USE OF INSULIN (HCC): ICD-10-CM

## 2020-02-24 LAB
ALBUMIN SERPL-MCNC: 4.7 G/DL (ref 3.4–5)
ALP BLD-CCNC: 72 U/L (ref 40–129)
ALT SERPL-CCNC: 57 U/L (ref 10–40)
ANION GAP SERPL CALCULATED.3IONS-SCNC: 18 MMOL/L (ref 3–16)
AST SERPL-CCNC: 59 U/L (ref 15–37)
BILIRUB SERPL-MCNC: 0.5 MG/DL (ref 0–1)
BILIRUBIN DIRECT: <0.2 MG/DL (ref 0–0.3)
BILIRUBIN, INDIRECT: ABNORMAL MG/DL (ref 0–1)
BUN BLDV-MCNC: 13 MG/DL (ref 7–20)
CALCIUM SERPL-MCNC: 10.2 MG/DL (ref 8.3–10.6)
CHLORIDE BLD-SCNC: 101 MMOL/L (ref 99–110)
CHOLESTEROL, TOTAL: 169 MG/DL (ref 0–199)
CO2: 26 MMOL/L (ref 21–32)
CREAT SERPL-MCNC: 0.6 MG/DL (ref 0.6–1.2)
CREATININE URINE: 243.4 MG/DL (ref 28–259)
GFR AFRICAN AMERICAN: >60
GFR NON-AFRICAN AMERICAN: >60
GLUCOSE BLD-MCNC: 86 MG/DL (ref 70–99)
HCT VFR BLD CALC: 44.2 % (ref 36–48)
HDLC SERPL-MCNC: 70 MG/DL (ref 40–60)
HEMOGLOBIN: 14.7 G/DL (ref 12–16)
LDL CHOLESTEROL CALCULATED: 79 MG/DL
MCH RBC QN AUTO: 31.7 PG (ref 26–34)
MCHC RBC AUTO-ENTMCNC: 33.2 G/DL (ref 31–36)
MCV RBC AUTO: 95.6 FL (ref 80–100)
MICROALBUMIN UR-MCNC: 6.4 MG/DL
MICROALBUMIN/CREAT UR-RTO: 26.3 MG/G (ref 0–30)
PDW BLD-RTO: 13.7 % (ref 12.4–15.4)
PLATELET # BLD: 189 K/UL (ref 135–450)
PMV BLD AUTO: 9.2 FL (ref 5–10.5)
POTASSIUM SERPL-SCNC: 3.7 MMOL/L (ref 3.5–5.1)
RBC # BLD: 4.62 M/UL (ref 4–5.2)
SODIUM BLD-SCNC: 145 MMOL/L (ref 136–145)
TOTAL PROTEIN: 8.3 G/DL (ref 6.4–8.2)
TRIGL SERPL-MCNC: 99 MG/DL (ref 0–150)
TSH SERPL DL<=0.05 MIU/L-ACNC: 2.2 UIU/ML (ref 0.27–4.2)
VLDLC SERPL CALC-MCNC: 20 MG/DL
WBC # BLD: 5.2 K/UL (ref 4–11)

## 2020-02-24 PROCEDURE — 90472 IMMUNIZATION ADMIN EACH ADD: CPT | Performed by: FAMILY MEDICINE

## 2020-02-24 PROCEDURE — 90732 PPSV23 VACC 2 YRS+ SUBQ/IM: CPT | Performed by: FAMILY MEDICINE

## 2020-02-24 PROCEDURE — 90746 HEPB VACCINE 3 DOSE ADULT IM: CPT | Performed by: FAMILY MEDICINE

## 2020-02-24 PROCEDURE — 90471 IMMUNIZATION ADMIN: CPT | Performed by: FAMILY MEDICINE

## 2020-02-24 PROCEDURE — 99214 OFFICE O/P EST MOD 30 MIN: CPT | Performed by: FAMILY MEDICINE

## 2020-02-24 SDOH — ECONOMIC STABILITY: FOOD INSECURITY: WITHIN THE PAST 12 MONTHS, THE FOOD YOU BOUGHT JUST DIDN'T LAST AND YOU DIDN'T HAVE MONEY TO GET MORE.: NEVER TRUE

## 2020-02-24 SDOH — ECONOMIC STABILITY: FOOD INSECURITY: WITHIN THE PAST 12 MONTHS, YOU WORRIED THAT YOUR FOOD WOULD RUN OUT BEFORE YOU GOT MONEY TO BUY MORE.: NEVER TRUE

## 2020-02-24 SDOH — ECONOMIC STABILITY: TRANSPORTATION INSECURITY
IN THE PAST 12 MONTHS, HAS THE LACK OF TRANSPORTATION KEPT YOU FROM MEDICAL APPOINTMENTS OR FROM GETTING MEDICATIONS?: NO

## 2020-02-24 SDOH — ECONOMIC STABILITY: TRANSPORTATION INSECURITY
IN THE PAST 12 MONTHS, HAS LACK OF TRANSPORTATION KEPT YOU FROM MEETINGS, WORK, OR FROM GETTING THINGS NEEDED FOR DAILY LIVING?: NO

## 2020-02-24 NOTE — PROGRESS NOTES
connections:     Talks on phone: None     Gets together: None     Attends Anabaptist service: None     Active member of club or organization: None     Attends meetings of clubs or organizations: None     Relationship status: None    Intimate partner violence:     Fear of current or ex partner: None     Emotionally abused: None     Physically abused: None     Forced sexual activity: None   Other Topics Concern    None   Social History Narrative    None     Current Outpatient Medications   Medication Sig Dispense Refill    atorvastatin (LIPITOR) 10 MG tablet TAKE 1 TABLET BY MOUTH EVERY DAY 90 tablet 0    amLODIPine (NORVASC) 10 MG tablet TAKE 1 TABLET BY MOUTH EVERY DAY 90 tablet 0    Potassium Chloride ER 8 MEQ CPCR TAKE 1 CAPSULE BY MOUTH TWICE A  capsule 0    JARDIANCE 10 MG tablet TAKE 1 TABLET BY MOUTH EVERY DAY 90 tablet 1    hydrochlorothiazide (HYDRODIURIL) 25 MG tablet TAKE 1 TABLET BY MOUTH EVERY DAY 90 tablet 1    BASAGLAR KWIKPEN 100 UNIT/ML injection pen INJECT 15 UNITS INTO THE SKIN NIGHTLY 6 pen 0    INVOKANA 100 MG TABS tablet TAKE 1 TAB BY MOUTH DAILY BEFORE BREAKFAST 90 tablet 0    EPINEPHrine (EPIPEN 2-CINTHYA) 0.3 MG/0.3ML SOAJ injection Inject 0.3 mLs into the muscle once for 1 dose Use as directed for allergic reaction 0.3 mL 0    atorvastatin (LIPITOR) 10 MG tablet TAKE 1 TABLET BY MOUTH DAILY 90 tablet 0    amLODIPine (NORVASC) 10 MG tablet TAKE 1 TAB BY MOUTH EVERY DAY 90 tablet 0    aspirin 81 MG tablet Take 81 mg by mouth daily      Probiotic Product (PROBIOTIC DAILY PO) Take by mouth      BD PEN NEEDLE IRMA U/F 32G X 4 MM MISC USE WITH INSULIN  Package 1    ONE TOUCH LANCETS MISC 1 each by Does not apply route daily 100 each 3    Blood Glucose Monitoring Suppl (ONE TOUCH BASIC SYSTEM) W/DEVICE KIT 1 kit by Does not apply route daily as needed (does not apply) 1 kit 0    glucose blood VI test strips (ASCENSIA AUTODISC VI;ONE TOUCH ULTRA TEST VI) strip 1 each by

## 2020-02-25 LAB
ESTIMATED AVERAGE GLUCOSE: 137 MG/DL
HBA1C MFR BLD: 6.4 %

## 2020-03-11 NOTE — TELEPHONE ENCOUNTER
Medication:   Requested Prescriptions     Pending Prescriptions Disp Refills    Potassium Chloride ER 8 MEQ CPCR [Pharmacy Med Name: POTASSIUM CL ER 8 MEQ CAPSULE] 180 capsule 0     Sig: TAKE 1 CAPSULE BY MOUTH TWICE A DAY     Last Filled:  12/17/19    Last appt: 2/24/2020   Next appt: 3/24/2020    Last Labs DM:   Lab Results   Component Value Date    LABA1C 6.4 02/24/2020     Last Lipid:   Lab Results   Component Value Date    CHOL 169 02/24/2020    TRIG 99 02/24/2020    HDL 70 02/24/2020    LDLCALC 79 02/24/2020     Last PSA: No results found for: PSA  Last Thyroid:   Lab Results   Component Value Date    TSH 2.20 02/24/2020

## 2020-03-12 RX ORDER — POTASSIUM CHLORIDE 600 MG/1
CAPSULE, EXTENDED RELEASE ORAL
Qty: 180 CAPSULE | Refills: 0 | Status: SHIPPED | OUTPATIENT
Start: 2020-03-12 | End: 2020-06-04

## 2020-03-23 ENCOUNTER — PATIENT MESSAGE (OUTPATIENT)
Dept: PRIMARY CARE CLINIC | Age: 60
End: 2020-03-23

## 2020-03-24 ENCOUNTER — NURSE ONLY (OUTPATIENT)
Dept: PRIMARY CARE CLINIC | Age: 60
End: 2020-03-24
Payer: COMMERCIAL

## 2020-03-24 PROCEDURE — 90471 IMMUNIZATION ADMIN: CPT | Performed by: FAMILY MEDICINE

## 2020-03-24 PROCEDURE — 90746 HEPB VACCINE 3 DOSE ADULT IM: CPT | Performed by: FAMILY MEDICINE

## 2020-04-16 RX ORDER — AMLODIPINE BESYLATE 10 MG/1
TABLET ORAL
Qty: 90 TABLET | Refills: 0 | Status: SHIPPED | OUTPATIENT
Start: 2020-04-16 | End: 2020-07-10

## 2020-04-16 NOTE — TELEPHONE ENCOUNTER
Medication:   Requested Prescriptions     Pending Prescriptions Disp Refills    amLODIPine (NORVASC) 10 MG tablet [Pharmacy Med Name: AMLODIPINE BESYLATE 10 MG TAB] 90 tablet 0     Sig: TAKE 1 TABLET BY MOUTH EVERY DAY       Last Filled:      Patient Phone Number: [phone] (home) [phone] (work)    Last appt: 2/24/2020   Next appt: 7/20/2020    Last BMP:   Lab Results   Component Value Date     02/24/2020    K 3.7 02/24/2020     02/24/2020    CO2 26 02/24/2020    ANIONGAP 18 02/24/2020    GLUCOSE 86 02/24/2020    BUN 13 02/24/2020    CREATININE 0.6 02/24/2020    LABGLOM >60 02/24/2020    GFRAA >60 02/24/2020    CALCIUM 10.2 02/24/2020      Last CMP:   Lab Results   Component Value Date     02/24/2020    K 3.7 02/24/2020     02/24/2020    CO2 26 02/24/2020    ANIONGAP 18 02/24/2020    GLUCOSE 86 02/24/2020    BUN 13 02/24/2020    CREATININE 0.6 02/24/2020    LABGLOM >60 02/24/2020    GFRAA >60 02/24/2020    PROT 8.3 02/24/2020    LABALBU 4.7 02/24/2020    AGRATIO 1.3 06/20/2018    BILITOT 0.5 02/24/2020    ALKPHOS 72 02/24/2020    ALT 57 02/24/2020    AST 59 02/24/2020    GLOB 3.7 06/20/2018     Last Renal Function:   Lab Results   Component Value Date     02/24/2020    K 3.7 02/24/2020     02/24/2020    CO2 26 02/24/2020    GLUCOSE 86 02/24/2020    BUN 13 02/24/2020    CREATININE 0.6 02/24/2020    LABALBU 4.7 02/24/2020    CALCIUM 10.2 02/24/2020    GFRAA >60 02/24/2020       Last OARRS: No flowsheet data found. Preferred Pharmacy:   [facility], [address]  [address]   [place] [address]  Phone: [phone] Fax: [phone]

## 2020-04-23 RX ORDER — ATORVASTATIN CALCIUM 10 MG/1
TABLET, FILM COATED ORAL
Qty: 90 TABLET | Refills: 0 | Status: SHIPPED | OUTPATIENT
Start: 2020-04-23 | End: 2020-07-17

## 2020-06-04 RX ORDER — POTASSIUM CHLORIDE 600 MG/1
CAPSULE, EXTENDED RELEASE ORAL
Qty: 180 CAPSULE | Refills: 0 | Status: SHIPPED | OUTPATIENT
Start: 2020-06-04 | End: 2020-08-31

## 2020-06-04 NOTE — TELEPHONE ENCOUNTER
Medication:   Requested Prescriptions     Pending Prescriptions Disp Refills    Potassium Chloride ER 8 MEQ CPCR [Pharmacy Med Name: POTASSIUM CL ER 8 MEQ CAPSULE] 180 capsule 0     Sig: TAKE 1 CAPSULE BY MOUTH TWICE A DAY     Last Filled:  3/12/20    Last appt: 2/24/2020   Next appt: 7/20/2020    Last Labs DM:   Lab Results   Component Value Date    LABA1C 6.4 02/24/2020     Last Lipid:   Lab Results   Component Value Date    CHOL 169 02/24/2020    TRIG 99 02/24/2020    HDL 70 02/24/2020    LDLCALC 79 02/24/2020     Last PSA: No results found for: PSA  Last Thyroid:   Lab Results   Component Value Date    TSH 2.20 02/24/2020

## 2020-06-05 RX ORDER — EMPAGLIFLOZIN 10 MG/1
TABLET, FILM COATED ORAL
Qty: 90 TABLET | Refills: 1 | Status: SHIPPED | OUTPATIENT
Start: 2020-06-05 | End: 2020-12-18

## 2020-06-05 RX ORDER — HYDROCHLOROTHIAZIDE 25 MG/1
TABLET ORAL
Qty: 90 TABLET | Refills: 1 | Status: SHIPPED | OUTPATIENT
Start: 2020-06-05 | End: 2020-12-21

## 2020-06-05 NOTE — TELEPHONE ENCOUNTER
Medication:   Requested Prescriptions     Pending Prescriptions Disp Refills    JARDIANCE 10 MG tablet [Pharmacy Med Name: Kristopher Rawlsoth 10 MG TABLET] 90 tablet 1     Sig: TAKE 1 TABLET BY MOUTH EVERY DAY    hydroCHLOROthiazide (HYDRODIURIL) 25 MG tablet [Pharmacy Med Name: HYDROCHLOROTHIAZIDE 25 MG TAB] 90 tablet 1     Sig: TAKE 1 TABLET BY MOUTH EVERY DAY       Last Filled:      Patient Phone Number: [phone] (home) [phone] (work)    Last appt: 2/24/2020   Next appt: 7/20/2020    Last BMP:   Lab Results   Component Value Date     02/24/2020    K 3.7 02/24/2020     02/24/2020    CO2 26 02/24/2020    ANIONGAP 18 02/24/2020    GLUCOSE 86 02/24/2020    BUN 13 02/24/2020    CREATININE 0.6 02/24/2020    LABGLOM >60 02/24/2020    GFRAA >60 02/24/2020    CALCIUM 10.2 02/24/2020      Last CMP:   Lab Results   Component Value Date     02/24/2020    K 3.7 02/24/2020     02/24/2020    CO2 26 02/24/2020    ANIONGAP 18 02/24/2020    GLUCOSE 86 02/24/2020    BUN 13 02/24/2020    CREATININE 0.6 02/24/2020    LABGLOM >60 02/24/2020    GFRAA >60 02/24/2020    PROT 8.3 02/24/2020    LABALBU 4.7 02/24/2020    AGRATIO 1.3 06/20/2018    BILITOT 0.5 02/24/2020    ALKPHOS 72 02/24/2020    ALT 57 02/24/2020    AST 59 02/24/2020    GLOB 3.7 06/20/2018     Last Renal Function:   Lab Results   Component Value Date     02/24/2020    K 3.7 02/24/2020     02/24/2020    CO2 26 02/24/2020    GLUCOSE 86 02/24/2020    BUN 13 02/24/2020    CREATININE 0.6 02/24/2020    LABALBU 4.7 02/24/2020    CALCIUM 10.2 02/24/2020    GFRAA >60 02/24/2020       Last OARRS: No flowsheet data found. Preferred Pharmacy:   [facility], [address] [facility]  [address]   [place] [address]  Phone: [phone] Fax: [phone]

## 2020-07-10 RX ORDER — AMLODIPINE BESYLATE 10 MG/1
TABLET ORAL
Qty: 90 TABLET | Refills: 0 | Status: SHIPPED | OUTPATIENT
Start: 2020-07-10 | End: 2020-10-05

## 2020-07-10 NOTE — TELEPHONE ENCOUNTER
Medication:   Requested Prescriptions     Pending Prescriptions Disp Refills    amLODIPine (NORVASC) 10 MG tablet [Pharmacy Med Name: AMLODIPINE BESYLATE 10 MG TAB] 90 tablet 0     Sig: TAKE 1 TABLET BY MOUTH EVERY DAY     Last Filled:  4/16/20    Last appt: 2/24/2020   Next appt: 7/20/2020    Last Labs DM:   Lab Results   Component Value Date    LABA1C 6.4 02/24/2020     Last Lipid:   Lab Results   Component Value Date    CHOL 169 02/24/2020    TRIG 99 02/24/2020    HDL 70 02/24/2020    LDLCALC 79 02/24/2020     Last PSA: No results found for: PSA  Last Thyroid:   Lab Results   Component Value Date    TSH 2.20 02/24/2020

## 2020-07-17 RX ORDER — ATORVASTATIN CALCIUM 10 MG/1
TABLET, FILM COATED ORAL
Qty: 90 TABLET | Refills: 0 | Status: SHIPPED | OUTPATIENT
Start: 2020-07-17 | End: 2020-10-15

## 2020-08-27 ENCOUNTER — NURSE ONLY (OUTPATIENT)
Dept: PRIMARY CARE CLINIC | Age: 60
End: 2020-08-27
Payer: COMMERCIAL

## 2020-08-27 PROCEDURE — 90471 IMMUNIZATION ADMIN: CPT | Performed by: FAMILY MEDICINE

## 2020-08-27 PROCEDURE — 90746 HEPB VACCINE 3 DOSE ADULT IM: CPT | Performed by: FAMILY MEDICINE

## 2020-08-27 RX ORDER — INSULIN GLARGINE 100 [IU]/ML
15 INJECTION, SOLUTION SUBCUTANEOUS NIGHTLY
Qty: 6 PEN | Refills: 0 | Status: SHIPPED | OUTPATIENT
Start: 2020-08-27 | End: 2020-11-09

## 2020-08-31 RX ORDER — POTASSIUM CHLORIDE 600 MG/1
CAPSULE, EXTENDED RELEASE ORAL
Qty: 180 CAPSULE | Refills: 0 | Status: SHIPPED | OUTPATIENT
Start: 2020-08-31 | End: 2020-12-21

## 2020-08-31 NOTE — TELEPHONE ENCOUNTER
Medication:   Requested Prescriptions     Pending Prescriptions Disp Refills    Potassium Chloride ER 8 [address] [Pharmacy Med Name: POTASSIUM CL ER 8 MEQ CAPSULE] 180 capsule 0     Sig: TAKE 1 CAPSULE BY MOUTH TWICE A DAY        Last Filled:      Patient Phone Number: [phone] (home) [phone] (work)    Last appt: 2/24/2020   Next appt: Visit date not found    Last OARRS: No flowsheet data found. Preferred Pharmacy:   [facility] [address] [facility]  [address]   [place] [address]  Phone: [phone] Fax: [phone]

## 2020-10-14 NOTE — TELEPHONE ENCOUNTER
Sent [employer] message to contact office to schedule an appointment  Medication:   Requested Prescriptions     Pending Prescriptions Disp Refills    atorvastatin (LIPITOR) 10 MG tablet [Pharmacy Med Name: ATORVASTATIN 10 MG TABLET] 90 tablet 0     Sig: TAKE 1 TABLET BY MOUTH EVERY DAY     Last Filled:  7/17/20    Last appt: 2/24/2020   Next appt: Visit date not found    Last Lipid:   Lab Results   Component Value Date    CHOL 169 02/24/2020    TRIG 99 02/24/2020    HDL 70 02/24/2020    [address] 02/24/2020

## 2020-10-15 RX ORDER — ATORVASTATIN CALCIUM 10 MG/1
TABLET, FILM COATED ORAL
Qty: 90 TABLET | Refills: 0 | Status: SHIPPED | OUTPATIENT
Start: 2020-10-15 | End: 2021-01-14

## 2020-11-06 DIAGNOSIS — E11.9 TYPE 2 DIABETES MELLITUS WITHOUT COMPLICATION, WITHOUT LONG-TERM CURRENT USE OF INSULIN (HCC): ICD-10-CM

## 2020-11-06 LAB
ALBUMIN SERPL-MCNC: 4.4 G/DL (ref 3.4–5)
ALP BLD-CCNC: 61 U/L (ref 40–129)
ALT SERPL-CCNC: 22 U/L (ref 10–40)
ANION GAP SERPL CALCULATED.3IONS-SCNC: 14 MMOL/L (ref 3–16)
AST SERPL-CCNC: 39 U/L (ref 15–37)
BILIRUB SERPL-MCNC: 0.9 MG/DL (ref 0–1)
BILIRUBIN DIRECT: <0.2 MG/DL (ref 0–0.3)
BILIRUBIN, INDIRECT: ABNORMAL MG/DL (ref 0–1)
BUN BLDV-MCNC: 16 MG/DL (ref 7–20)
CALCIUM SERPL-MCNC: 10 MG/DL (ref 8.3–10.6)
CHLORIDE BLD-SCNC: 101 MMOL/L (ref 99–110)
CHOLESTEROL, TOTAL: 175 MG/DL (ref 0–199)
CO2: 26 MMOL/L (ref 21–32)
CREAT SERPL-MCNC: 0.6 MG/DL (ref 0.6–1.2)
FOLATE: >20 NG/ML (ref 4.78–24.2)
GFR AFRICAN AMERICAN: >60
GFR NON-AFRICAN AMERICAN: >60
GLUCOSE BLD-MCNC: 109 MG/DL (ref 70–99)
HCT VFR BLD CALC: 42.4 % (ref 36–48)
HDLC SERPL-MCNC: 73 MG/DL (ref 40–60)
HEMOGLOBIN: 14.1 G/DL (ref 12–16)
LDL CHOLESTEROL CALCULATED: 86 MG/DL
MCH RBC QN AUTO: 32.1 PG (ref 26–34)
MCHC RBC AUTO-ENTMCNC: 33.3 G/DL (ref 31–36)
MCV RBC AUTO: 96.4 FL (ref 80–100)
PDW BLD-RTO: 12.6 % (ref 12.4–15.4)
PLATELET # BLD: 188 K/UL (ref 135–450)
PMV BLD AUTO: 9.6 FL (ref 5–10.5)
POTASSIUM SERPL-SCNC: 3.7 MMOL/L (ref 3.5–5.1)
RBC # BLD: 4.4 M/UL (ref 4–5.2)
SODIUM BLD-SCNC: 141 MMOL/L (ref 136–145)
TOTAL PROTEIN: 7.9 G/DL (ref 6.4–8.2)
TRIGL SERPL-MCNC: 79 MG/DL (ref 0–150)
TSH SERPL DL<=0.05 MIU/L-ACNC: 2.1 UIU/ML (ref 0.27–4.2)
VITAMIN B-12: 1231 PG/ML (ref 211–911)
VLDLC SERPL CALC-MCNC: 16 MG/DL
WBC # BLD: 4.5 K/UL (ref 4–11)

## 2020-11-07 LAB
ESTIMATED AVERAGE GLUCOSE: 108.3 MG/DL
HBA1C MFR BLD: 5.4 %

## 2020-11-09 ENCOUNTER — OFFICE VISIT (OUTPATIENT)
Dept: PRIMARY CARE CLINIC | Age: 60
End: 2020-11-09
Payer: COMMERCIAL

## 2020-11-09 VITALS
HEART RATE: 99 BPM | BODY MASS INDEX: 26.44 KG/M2 | RESPIRATION RATE: 14 BRPM | TEMPERATURE: 97.9 F | OXYGEN SATURATION: 98 % | WEIGHT: 163.8 LBS | DIASTOLIC BLOOD PRESSURE: 78 MMHG | SYSTOLIC BLOOD PRESSURE: 130 MMHG

## 2020-11-09 PROCEDURE — 99214 OFFICE O/P EST MOD 30 MIN: CPT | Performed by: FAMILY MEDICINE

## 2020-11-09 NOTE — PROGRESS NOTES
Subjective:      Patient ID: Gene Corbett is a 63 y.o. female. HPI     Diabetes Mellitus Type II, Follow-up: Patient here for follow-up of Type 2 diabetes mellitus. Current symptoms/problems include none and have been stable. Symptoms have been present for several years. Known diabetic complications: none  Cardiovascular risk factors: advanced age (older than 54 for men, 72 for women), diabetes mellitus, hypertension and obesity (BMI >= 30 kg/m2)  Current diabetic medications include see med's list .     Eye exam current (within one year): unknown  Weight trend: stable  Prior visit with dietician: no  Current diet: in general, an \"unhealthy\" diet  Current exercise: none    Current monitoring regimen: none  Home blood sugar records: patient does not test  Any episodes of hypoglycemia? no    Is She on ACE inhibitor or angiotensin II receptor blocker? Yes   lisinopril (generic)  She stopped her Lantus just Jardiance feeling very well  She is been eating a lot healthier last more than 30 pound feeling great exercise daily    Hypertension: Patient here for follow-up of elevated blood pressure. She is exercising and is adherent to low salt diet. Blood pressure is well controlled at home. Cardiac symptoms none. Patient denies chest pain, chest pressure/discomfort, exertional chest pressure/discomfort, fatigue, lower extremity edema, near-syncope, orthopnea, palpitations, paroxysmal nocturnal dyspnea, syncope and tachypnea. Cardiovascular risk factors: diabetes mellitus and hypertension. Use of agents associated with hypertension: none. History of target organ damage: none. Hyperlipidemia: Patient presents with hyperlipidemia. She was tested because DM. Her last labs showed Total cholesterol see lab,. none. There is a family history of hyperlipidemia. There is a family history of early ischemia heart disease.   Pt with depression doing well on current med's no SE  Dark spots on her lower legs when you look at it she wants to see a dermatologist    Review of Systems   Constitutional: Negative. HENT: Negative. Eyes: Negative. Respiratory: Negative. Cardiovascular: Negative. Gastrointestinal: Negative. Endocrine: Negative. Genitourinary: Negative. Musculoskeletal: Negative. Skin: Negative. Allergic/Immunologic: Negative. Neurological: Negative. Hematological: Negative. Psychiatric/Behavioral:. Negative for hallucinations, behavioral problems, confusion, sleep disturbance, self-injury, dysphoric mood and decreased concentration. . The patient is not hyperactive. All other systems reviewed and are negative. Past Medical History:   Diagnosis Date    Hypertension       No past surgical history on file. Family History   Problem Relation Age of Onset    Diabetes Mother     Stroke Mother     High Blood Pressure Mother     Diabetes Father     High Blood Pressure Father     Cancer Father         Brain tumor @age 50     High Blood Pressure Brother     High Cholesterol Brother     Other Brother         Neurosarcoidosis /Brain stem     Social History     Socioeconomic History    Marital status:      Spouse name: Lizet Vera Number of children: 1    Years of education: None    Highest education level: None   Occupational History    Occupation: retired      Comment: office work   Social Needs    Financial resource strain: None    Food insecurity     Worry: Never true     Inability: Never true    Transportation needs     Medical: No     Non-medical: No   Tobacco Use    Smoking status: Never Smoker    Smokeless tobacco: Never Used   Substance and Sexual Activity    Alcohol use:  Yes     Alcohol/week: 1.0 standard drinks     Types: 1 Glasses of wine per week    Drug use: No    Sexual activity: Yes     Partners: Male     Comment: M daughter 28 y o   Lifestyle    Physical activity     Days per week: None     Minutes per session: None    Stress: None Relationships    Social connections     Talks on phone: None     Gets together: None     Attends Hoahaoism service: None     Active member of club or organization: None     Attends meetings of clubs or organizations: None     Relationship status: None    Intimate partner violence     Fear of current or ex partner: None     Emotionally abused: None     Physically abused: None     Forced sexual activity: None   Other Topics Concern    None   Social History Narrative    None     Current Outpatient Medications   Medication Sig Dispense Refill    atorvastatin (LIPITOR) 10 MG tablet TAKE 1 TABLET BY MOUTH EVERY DAY 90 tablet 0    amLODIPine (NORVASC) 10 MG tablet TAKE 1 TABLET BY MOUTH EVERY DAY 90 tablet 0    Potassium Chloride ER 8 MEQ CPCR TAKE 1 CAPSULE BY MOUTH TWICE A  capsule 0    JARDIANCE 10 MG tablet TAKE 1 TABLET BY MOUTH EVERY DAY 90 tablet 1    hydroCHLOROthiazide (HYDRODIURIL) 25 MG tablet TAKE 1 TABLET BY MOUTH EVERY DAY 90 tablet 1    atorvastatin (LIPITOR) 10 MG tablet TAKE 1 TABLET BY MOUTH DAILY 90 tablet 0    aspirin 81 MG tablet Take 81 mg by mouth daily      Probiotic Product (PROBIOTIC DAILY PO) Take by mouth      BD PEN NEEDLE IRMA U/F 32G X 4 MM MISC USE WITH INSULIN  Package 1    ONE TOUCH LANCETS MISC 1 each by Does not apply route daily 100 each 3    Blood Glucose Monitoring Suppl (ONE TOUCH BASIC SYSTEM) W/DEVICE KIT 1 kit by Does not apply route daily as needed (does not apply) 1 kit 0    glucose blood VI test strips (ASCENSIA AUTODISC VI;ONE TOUCH ULTRA TEST VI) strip 1 each by In Vitro route daily As needed.  BRAND ONE TOUCH 100 each 3    insulin glargine (BASAGLAR KWIKPEN) 100 UNIT/ML injection pen Inject 15 Units into the skin nightly (Patient not taking: Reported on 11/9/2020) 6 pen 0    INVOKANA 100 MG TABS tablet TAKE 1 TAB BY MOUTH DAILY BEFORE BREAKFAST (Patient not taking: Reported on 11/9/2020) 90 tablet 0    EPINEPHrine (EPIPEN 2-CINTHYA) 0.3 MG/0.3ML SOAJ injection Inject 0.3 mLs into the muscle once for 1 dose Use as directed for allergic reaction 0.3 mL 0    EPINEPHrine (EPIPEN 2-CINTHYA) 0.3 MG/0.3ML SOAJ injection Inject 0.3 mLs into the skin once for 1 dose Use as directed for allergic reaction 0.3 mL 0     No current facility-administered medications for this visit. No Known Allergies      Objective:   Physical Exam   Constitutional: She appears well-developed and well-nourished. No distress. HENT:   Head: Normocephalic and atraumatic. Right Ear: External ear normal.   Left Ear: External ear normal.   Nose: Nose normal.   Mouth/Throat: Oropharynx is clear and moist. No oropharyngeal exudate. Eyes: Conjunctivae and OEM are normal. Pupils are equal, round, and reactive to light. Right eye exhibits no discharge. Left eye exhibits no discharge. No scleral icterus. Neck: Normal range of motion. Neck supple. No JVD present. No tracheal deviation present. No thyromegaly present. Cardiovascular: Normal rate, regular rhythm, normal heart sounds and intact distal pulses. Pulmonary/Chest: Effort normal and breath sounds normal. No stridor. No respiratory distress. She has no wheezes. She has no rales. She exhibits no tenderness. Abdominal: Soft. Bowel sounds are normal. She exhibits no distension and no mass. There is no tenderness. There is no rebound and no guarding. Musculoskeletal: Normal range of motion. She exhibits no edema or tenderness. Lymphadenopathy:     She has no cervical adenopathy. Skin: Skin is warm and dry. No rash noted. She is not diaphoretic. No erythema. No pallor. Pigmented areas on lower legs around the ankles  microfilaments test exam was negative   Vitals reviewed. Assessment:      Diagnosis Orders   1. Type 2 diabetes mellitus without complication, without long-term current use of insulin (HCC)  Basic Metabolic Panel    Hemoglobin A1C    Lipid Panel   2. Hypertension, benign     3.  Mixed hyperlipidemia  Lipid Panel   4. Skin disorder  Ambulatory referral to Dermatology   5.  Colon cancer screening  Ambulatory referral to Gastroenterology         Plan:      See orders, doing well continue the same   Doing wonderful encourage her to continue with her healthy lifestyle exercise eating healthy  Discussed her keto diet for long-term approach  The of Lyme test recheck in 3 months continue Jardiance

## 2020-11-24 RX ORDER — INSULIN GLARGINE 100 [IU]/ML
15 INJECTION, SOLUTION SUBCUTANEOUS NIGHTLY
Qty: 5 PEN | Refills: 0 | Status: SHIPPED | OUTPATIENT
Start: 2020-11-24 | End: 2021-07-01

## 2020-11-24 NOTE — TELEPHONE ENCOUNTER
Medication:   Requested Prescriptions     Pending Prescriptions Disp Refills    BASAGLAR KWIKPEN 100 UNIT/ML injection pen [Pharmacy Med Name: Sherrie Bueno 100 UNIT/ML KWIKPEN] 5 pen 0     Sig: INJECT 15 UNITS INTO THE SKIN NIGHTLY     Last Filled: 8.27.20    Last appt: 11/9/2020   Next appt: Visit date not found    Last OARRS: No flowsheet data found.

## 2020-12-18 RX ORDER — EMPAGLIFLOZIN 10 MG/1
TABLET, FILM COATED ORAL
Qty: 90 TABLET | Refills: 1 | Status: SHIPPED | OUTPATIENT
Start: 2020-12-18 | End: 2021-06-28

## 2020-12-18 NOTE — TELEPHONE ENCOUNTER
Medication:   Requested Prescriptions     Pending Prescriptions Disp Refills    JARDIANCE 10 MG tablet [Pharmacy Med Name: Vivien Graham 10 MG TABLET] 90 tablet 1     Sig: TAKE 1 TABLET BY MOUTH EVERY DAY       Last Filled:  6/5/20    Patient Phone Number: [phone] (home)     Last appt: 11/9/2020   Next appt: Visit date not found    Last Labs DM:   Lab Results   Component Value Date    LABA1C 5.4 11/06/2020       Last OARRS: No flowsheet data found. Preferred Pharmacy:   [facility], [address] [facility]  [address]   [place] [address]  Phone: [phone] Fax: [phone]

## 2020-12-21 RX ORDER — HYDROCHLOROTHIAZIDE 25 MG/1
TABLET ORAL
Qty: 90 TABLET | Refills: 1 | Status: SHIPPED | OUTPATIENT
Start: 2020-12-21 | End: 2021-06-28

## 2020-12-21 RX ORDER — POTASSIUM CHLORIDE 600 MG/1
CAPSULE, EXTENDED RELEASE ORAL
Qty: 180 CAPSULE | Refills: 0 | Status: SHIPPED | OUTPATIENT
Start: 2020-12-21 | End: 2021-03-19

## 2020-12-21 NOTE — TELEPHONE ENCOUNTER
Medication:   Requested Prescriptions     Pending Prescriptions Disp Refills    Potassium Chloride ER 8 MEQ CPCR [Pharmacy Med Name: POTASSIUM CL ER 8 MEQ CAPSULE] 180 capsule 0     Sig: TAKE 1 CAPSULE BY MOUTH TWICE A DAY    hydroCHLOROthiazide (HYDRODIURIL) 25 MG tablet [Pharmacy Med Name: HYDROCHLOROTHIAZIDE 25 MG TAB] 90 tablet 1     Sig: TAKE 1 TABLET BY MOUTH EVERY DAY        Last Filled:      Patient Phone Number: [phone] (home)     Last appt: 11/9/2020   Next appt: Visit date not found    Last OARRS: No flowsheet data found. Preferred Pharmacy:   [facility] [facility]  [address]   [place] [address]  Phone: [phone] Fax: [phone]

## 2021-01-04 RX ORDER — AMLODIPINE BESYLATE 10 MG/1
TABLET ORAL
Qty: 90 TABLET | Refills: 0 | Status: SHIPPED | OUTPATIENT
Start: 2021-01-04 | End: 2021-04-01

## 2021-01-04 NOTE — TELEPHONE ENCOUNTER
Medication:   Requested Prescriptions     Pending Prescriptions Disp Refills    amLODIPine (NORVASC) 10 MG tablet [Pharmacy Med Name: AMLODIPINE BESYLATE 10 MG TAB] 90 tablet 0     Sig: TAKE 1 TABLET BY MOUTH EVERY DAY       Last Filled:      Patient Phone Number: [phone] (home)     Last appt: 11/9/2020   Next appt: Visit date not found    Last BMP:   Lab Results   Component Value Date     11/06/2020    K 3.7 11/06/2020     11/06/2020    CO2 26 11/06/2020    ANIONGAP 14 11/06/2020    GLUCOSE 109 11/06/2020    BUN 16 11/06/2020    CREATININE 0.6 11/06/2020    LABGLOM >60 11/06/2020    GFRAA >60 11/06/2020    CALCIUM 10.0 11/06/2020      Last CMP:   Lab Results   Component Value Date     11/06/2020    K 3.7 11/06/2020     11/06/2020    CO2 26 11/06/2020    ANIONGAP 14 11/06/2020    GLUCOSE 109 11/06/2020    BUN 16 11/06/2020    CREATININE 0.6 11/06/2020    LABGLOM >60 11/06/2020    GFRAA >60 11/06/2020    PROT 7.9 11/06/2020    LABALBU 4.4 11/06/2020    AGRATIO 1.3 06/20/2018    BILITOT 0.9 11/06/2020    ALKPHOS 61 11/06/2020    ALT 22 11/06/2020    AST 39 11/06/2020    GLOB 3.7 06/20/2018     Last Renal Function:   Lab Results   Component Value Date     11/06/2020    K 3.7 11/06/2020     11/06/2020    CO2 26 11/06/2020    GLUCOSE 109 11/06/2020    BUN 16 11/06/2020    CREATININE 0.6 11/06/2020    LABALBU 4.4 11/06/2020    CALCIUM 10.0 11/06/2020    GFRAA >60 11/06/2020       Last OARRS: No flowsheet data found. Preferred Pharmacy:   [facility], [address]  [address]   [place] [address]  Phone: [phone] Fax: [phone]

## 2021-01-07 ENCOUNTER — HOSPITAL ENCOUNTER (OUTPATIENT)
Dept: WOMENS IMAGING | Age: 61
Discharge: HOME | End: 2021-01-07
Payer: COMMERCIAL

## 2021-01-07 DIAGNOSIS — Z12.31 VISIT FOR SCREENING MAMMOGRAM: ICD-10-CM

## 2021-01-07 PROCEDURE — 77067 SCR MAMMO BI INCL CAD: CPT

## 2021-01-13 NOTE — TELEPHONE ENCOUNTER
Medication:   Requested Prescriptions     Pending Prescriptions Disp Refills    atorvastatin (LIPITOR) 10 MG tablet [Pharmacy Med Name: ATORVASTATIN 10 MG TABLET] 90 tablet 0     Sig: TAKE 1 TABLET BY MOUTH EVERY DAY         Last appt: 11/9/2020   Next appt: Visit date not found    Last Lipid:   Lab Results   Component Value Date    CHOL 175 11/06/2020    TRIG 79 11/06/2020    HDL 73 11/06/2020    LDLCALC 86 11/06/2020

## 2021-01-14 RX ORDER — ATORVASTATIN CALCIUM 10 MG/1
TABLET, FILM COATED ORAL
Qty: 90 TABLET | Refills: 0 | Status: SHIPPED | OUTPATIENT
Start: 2021-01-14 | End: 2021-04-12

## 2021-03-05 DIAGNOSIS — E11.9 TYPE 2 DIABETES MELLITUS WITHOUT COMPLICATION, WITHOUT LONG-TERM CURRENT USE OF INSULIN (HCC): ICD-10-CM

## 2021-03-05 DIAGNOSIS — E78.2 MIXED HYPERLIPIDEMIA: ICD-10-CM

## 2021-03-05 LAB
ANION GAP SERPL CALCULATED.3IONS-SCNC: 10 MMOL/L (ref 3–16)
BUN BLDV-MCNC: 13 MG/DL (ref 7–20)
CALCIUM SERPL-MCNC: 10.2 MG/DL (ref 8.3–10.6)
CHLORIDE BLD-SCNC: 101 MMOL/L (ref 99–110)
CHOLESTEROL, TOTAL: 192 MG/DL (ref 0–199)
CO2: 30 MMOL/L (ref 21–32)
CREAT SERPL-MCNC: 0.5 MG/DL (ref 0.6–1.2)
ESTIMATED AVERAGE GLUCOSE: 108.3 MG/DL
GFR AFRICAN AMERICAN: >60
GFR NON-AFRICAN AMERICAN: >60
GLUCOSE BLD-MCNC: 90 MG/DL (ref 70–99)
HBA1C MFR BLD: 5.4 %
HDLC SERPL-MCNC: 92 MG/DL (ref 40–60)
LDL CHOLESTEROL CALCULATED: 89 MG/DL
POTASSIUM SERPL-SCNC: 3.8 MMOL/L (ref 3.5–5.1)
SODIUM BLD-SCNC: 141 MMOL/L (ref 136–145)
TRIGL SERPL-MCNC: 57 MG/DL (ref 0–150)
VLDLC SERPL CALC-MCNC: 11 MG/DL

## 2021-03-09 ENCOUNTER — IMMUNIZATION (OUTPATIENT)
Dept: PRIMARY CARE CLINIC | Age: 61
End: 2021-03-09
Payer: COMMERCIAL

## 2021-03-09 PROCEDURE — 0011A PR IMM ADMN SARSCOV2 100 MCG/0.5 ML 1ST DOSE: CPT | Performed by: FAMILY MEDICINE

## 2021-03-09 PROCEDURE — 91301 COVID-19, MODERNA VACCINE 100MCG/0.5ML DOSE: CPT | Performed by: FAMILY MEDICINE

## 2021-03-19 RX ORDER — POTASSIUM CHLORIDE 600 MG/1
CAPSULE, EXTENDED RELEASE ORAL
Qty: 180 CAPSULE | Refills: 0 | Status: SHIPPED | OUTPATIENT
Start: 2021-03-19 | End: 2021-06-14

## 2021-03-19 NOTE — TELEPHONE ENCOUNTER
Medication:   Requested Prescriptions     Pending Prescriptions Disp Refills    Potassium Chloride ER 8 MEQ CPCR [Pharmacy Med Name: POTASSIUM CL ER 8 MEQ CAPSULE] 180 capsule 0     Sig: TAKE 1 CAPSULE BY MOUTH TWICE A DAY     Last Filled:  12/21/20    Last appt: 11/9/2020   Next appt: Visit date not found    Last Labs DM:   Lab Results   Component Value Date    LABA1C 5.4 03/05/2021     Last Lipid:   Lab Results   Component Value Date    CHOL 192 03/05/2021    TRIG 57 03/05/2021    HDL 92 03/05/2021    LDLCALC 89 03/05/2021     Last Thyroid:   Lab Results   Component Value Date    TSH 2.10 11/06/2020

## 2021-03-31 NOTE — TELEPHONE ENCOUNTER
Medication:   Requested Prescriptions     Pending Prescriptions Disp Refills    amLODIPine (NORVASC) 10 MG tablet [Pharmacy Med Name: AMLODIPINE BESYLATE 10 MG TAB] 90 tablet 0     Sig: TAKE 1 TABLET BY MOUTH EVERY DAY     Last Filled: 1.4.21    Last appt: 11/9/2020   Next appt: Visit date not found    Last OARRS: No flowsheet data found.

## 2021-04-01 RX ORDER — AMLODIPINE BESYLATE 10 MG/1
TABLET ORAL
Qty: 90 TABLET | Refills: 0 | Status: SHIPPED | OUTPATIENT
Start: 2021-04-01 | End: 2021-06-24

## 2021-04-06 ENCOUNTER — IMMUNIZATION (OUTPATIENT)
Dept: PRIMARY CARE CLINIC | Age: 61
End: 2021-04-06
Payer: COMMERCIAL

## 2021-04-06 PROCEDURE — 0012A COVID-19, MODERNA VACCINE 100MCG/0.5ML DOSE: CPT | Performed by: FAMILY MEDICINE

## 2021-04-06 PROCEDURE — 91301 COVID-19, MODERNA VACCINE 100MCG/0.5ML DOSE: CPT | Performed by: FAMILY MEDICINE

## 2021-04-12 RX ORDER — ATORVASTATIN CALCIUM 10 MG/1
TABLET, FILM COATED ORAL
Qty: 90 TABLET | Refills: 0 | Status: SHIPPED | OUTPATIENT
Start: 2021-04-12 | End: 2021-07-12

## 2021-04-12 NOTE — TELEPHONE ENCOUNTER
Medication:   Requested Prescriptions     Pending Prescriptions Disp Refills    atorvastatin (LIPITOR) 10 MG tablet [Pharmacy Med Name: ATORVASTATIN 10 MG TABLET] 90 tablet 0     Sig: TAKE 1 TABLET BY MOUTH EVERY DAY     Last Filled:  1/14/21    Last appt: 11/9/2020   Next appt: Visit date not found    Last Lipid:   Lab Results   Component Value Date    CHOL 192 03/05/2021    TRIG 57 03/05/2021    HDL 92 03/05/2021    LDLCALC 89 03/05/2021

## 2021-05-05 LAB — DIABETIC RETINOPATHY: NEGATIVE

## 2021-06-14 RX ORDER — POTASSIUM CHLORIDE 600 MG/1
CAPSULE, EXTENDED RELEASE ORAL
Qty: 180 CAPSULE | Refills: 0 | Status: SHIPPED | OUTPATIENT
Start: 2021-06-14 | End: 2021-09-09

## 2021-06-14 NOTE — TELEPHONE ENCOUNTER
Medication:   Requested Prescriptions     Pending Prescriptions Disp Refills    Potassium Chloride ER 8 MEQ CPCR [Pharmacy Med Name: POTASSIUM CL ER 8 MEQ CAPSULE] 180 capsule 0     Sig: TAKE 1 CAPSULE BY MOUTH TWICE A DAY     Last Filled:  03/19/21    Last appt: 11/9/2020   Next appt: Visit date not found    Last OARRS: No flowsheet data found.

## 2021-06-24 RX ORDER — AMLODIPINE BESYLATE 10 MG/1
TABLET ORAL
Qty: 90 TABLET | Refills: 0 | Status: SHIPPED | OUTPATIENT
Start: 2021-06-24 | End: 2021-09-20

## 2021-06-24 NOTE — TELEPHONE ENCOUNTER
Medication:   Requested Prescriptions     Pending Prescriptions Disp Refills    amLODIPine (NORVASC) 10 MG tablet [Pharmacy Med Name: AMLODIPINE BESYLATE 10 MG TAB] 90 tablet 0     Sig: TAKE 1 TABLET BY MOUTH EVERY DAY     Last Filled:  4.1.21  Last appt: 11/9/2020   Next appt: 7.1.21  Last OARRS: No flowsheet data found.

## 2021-06-28 RX ORDER — HYDROCHLOROTHIAZIDE 25 MG/1
TABLET ORAL
Qty: 90 TABLET | Refills: 1 | Status: SHIPPED | OUTPATIENT
Start: 2021-06-28 | End: 2022-01-03

## 2021-06-28 RX ORDER — EMPAGLIFLOZIN 10 MG/1
TABLET, FILM COATED ORAL
Qty: 90 TABLET | Refills: 1 | Status: SHIPPED | OUTPATIENT
Start: 2021-06-28 | End: 2022-01-03

## 2021-06-28 NOTE — TELEPHONE ENCOUNTER
Medication:   Requested Prescriptions     Pending Prescriptions Disp Refills    hydroCHLOROthiazide (HYDRODIURIL) 25 MG tablet [Pharmacy Med Name: HYDROCHLOROTHIAZIDE 25 MG TAB] 90 tablet 1     Sig: TAKE 1 TABLET BY MOUTH EVERY DAY    Anna Brinks 10 MG tablet [Pharmacy Med Name: Anna Brinks 10 MG TABLET] 90 tablet 1     Sig: TAKE 1 TABLET BY MOUTH EVERY DAY       Last Filled:      Patient Phone Number: [phone] (home)     Last appt: 11/9/2020   Next appt: 7/1/2021    Last Labs DM:   Lab Results   Component Value Date    LABA1C 5.4 03/05/2021       Last OARRS: No flowsheet data found. Preferred Pharmacy:   [facility] [address] [facility]  [address]   [place] [facility] [address]  Phone: [phone] Fax: [phone]

## 2021-07-01 ENCOUNTER — OFFICE VISIT (OUTPATIENT)
Dept: PRIMARY CARE CLINIC | Age: 61
End: 2021-07-01
Payer: COMMERCIAL

## 2021-07-01 VITALS
TEMPERATURE: 98.3 F | DIASTOLIC BLOOD PRESSURE: 84 MMHG | SYSTOLIC BLOOD PRESSURE: 136 MMHG | OXYGEN SATURATION: 97 % | BODY MASS INDEX: 25.91 KG/M2 | WEIGHT: 161.2 LBS | HEIGHT: 66 IN | HEART RATE: 93 BPM

## 2021-07-01 DIAGNOSIS — E78.2 MIXED HYPERLIPIDEMIA: ICD-10-CM

## 2021-07-01 DIAGNOSIS — I10 HYPERTENSION, BENIGN: ICD-10-CM

## 2021-07-01 DIAGNOSIS — E11.9 TYPE 2 DIABETES MELLITUS WITHOUT COMPLICATION, WITHOUT LONG-TERM CURRENT USE OF INSULIN (HCC): ICD-10-CM

## 2021-07-01 DIAGNOSIS — E11.9 TYPE 2 DIABETES MELLITUS WITHOUT COMPLICATION, WITHOUT LONG-TERM CURRENT USE OF INSULIN (HCC): Primary | ICD-10-CM

## 2021-07-01 LAB
ALBUMIN SERPL-MCNC: 5 G/DL (ref 3.4–5)
ALP BLD-CCNC: 78 U/L (ref 40–129)
ALT SERPL-CCNC: 106 U/L (ref 10–40)
ANION GAP SERPL CALCULATED.3IONS-SCNC: 20 MMOL/L (ref 3–16)
AST SERPL-CCNC: 145 U/L (ref 15–37)
BILIRUB SERPL-MCNC: 0.6 MG/DL (ref 0–1)
BILIRUBIN DIRECT: <0.2 MG/DL (ref 0–0.3)
BILIRUBIN, INDIRECT: ABNORMAL MG/DL (ref 0–1)
BUN BLDV-MCNC: 9 MG/DL (ref 7–20)
CALCIUM SERPL-MCNC: 10 MG/DL (ref 8.3–10.6)
CHLORIDE BLD-SCNC: 101 MMOL/L (ref 99–110)
CHOLESTEROL, TOTAL: 244 MG/DL (ref 0–199)
CO2: 24 MMOL/L (ref 21–32)
CREAT SERPL-MCNC: 0.5 MG/DL (ref 0.6–1.2)
CREATININE URINE: 64.9 MG/DL (ref 28–259)
GFR AFRICAN AMERICAN: >60
GFR NON-AFRICAN AMERICAN: >60
GLUCOSE BLD-MCNC: 93 MG/DL (ref 70–99)
HCT VFR BLD CALC: 43.9 % (ref 36–48)
HDLC SERPL-MCNC: 109 MG/DL (ref 40–60)
HEMOGLOBIN: 14.6 G/DL (ref 12–16)
LDL CHOLESTEROL CALCULATED: 121 MG/DL
MCH RBC QN AUTO: 33 PG (ref 26–34)
MCHC RBC AUTO-ENTMCNC: 33.3 G/DL (ref 31–36)
MCV RBC AUTO: 99 FL (ref 80–100)
MICROALBUMIN UR-MCNC: 2 MG/DL
MICROALBUMIN/CREAT UR-RTO: 30.8 MG/G (ref 0–30)
PDW BLD-RTO: 13 % (ref 12.4–15.4)
PLATELET # BLD: 186 K/UL (ref 135–450)
PMV BLD AUTO: 9.2 FL (ref 5–10.5)
POTASSIUM SERPL-SCNC: 4 MMOL/L (ref 3.5–5.1)
RBC # BLD: 4.44 M/UL (ref 4–5.2)
SODIUM BLD-SCNC: 145 MMOL/L (ref 136–145)
TOTAL PROTEIN: 8.3 G/DL (ref 6.4–8.2)
TRIGL SERPL-MCNC: 68 MG/DL (ref 0–150)
TSH SERPL DL<=0.05 MIU/L-ACNC: 1.14 UIU/ML (ref 0.27–4.2)
VLDLC SERPL CALC-MCNC: 14 MG/DL
WBC # BLD: 3.7 K/UL (ref 4–11)

## 2021-07-01 PROCEDURE — 99214 OFFICE O/P EST MOD 30 MIN: CPT | Performed by: FAMILY MEDICINE

## 2021-07-01 SDOH — ECONOMIC STABILITY: HOUSING INSECURITY
IN THE LAST 12 MONTHS, WAS THERE A TIME WHEN YOU DID NOT HAVE A STEADY PLACE TO SLEEP OR SLEPT IN A SHELTER (INCLUDING NOW)?: NO

## 2021-07-01 SDOH — HEALTH STABILITY: PHYSICAL HEALTH: ON AVERAGE, HOW MANY MINUTES DO YOU ENGAGE IN EXERCISE AT THIS LEVEL?: 40 MIN

## 2021-07-01 SDOH — ECONOMIC STABILITY: FOOD INSECURITY: WITHIN THE PAST 12 MONTHS, YOU WORRIED THAT YOUR FOOD WOULD RUN OUT BEFORE YOU GOT MONEY TO BUY MORE.: NEVER TRUE

## 2021-07-01 SDOH — ECONOMIC STABILITY: FOOD INSECURITY: WITHIN THE PAST 12 MONTHS, THE FOOD YOU BOUGHT JUST DIDN'T LAST AND YOU DIDN'T HAVE MONEY TO GET MORE.: NEVER TRUE

## 2021-07-01 SDOH — HEALTH STABILITY: PHYSICAL HEALTH: ON AVERAGE, HOW MANY DAYS PER WEEK DO YOU ENGAGE IN MODERATE TO STRENUOUS EXERCISE (LIKE A BRISK WALK)?: 4 DAYS

## 2021-07-01 SDOH — ECONOMIC STABILITY: INCOME INSECURITY: IN THE LAST 12 MONTHS, WAS THERE A TIME WHEN YOU WERE NOT ABLE TO PAY THE MORTGAGE OR RENT ON TIME?: NO

## 2021-07-01 SDOH — ECONOMIC STABILITY: HOUSING INSECURITY: IN THE LAST 12 MONTHS, HOW MANY PLACES HAVE YOU LIVED?: 1

## 2021-07-01 ASSESSMENT — SOCIAL DETERMINANTS OF HEALTH (SDOH)
IN A TYPICAL WEEK, HOW MANY TIMES DO YOU TALK ON THE PHONE WITH FAMILY, FRIENDS, OR NEIGHBORS?: MORE THAN THREE TIMES A WEEK
HOW OFTEN DO YOU ATTEND CHURCH OR RELIGIOUS SERVICES?: MORE THAN 4 TIMES PER YEAR
HOW HARD IS IT FOR YOU TO PAY FOR THE VERY BASICS LIKE FOOD, HOUSING, MEDICAL CARE, AND HEATING?: NOT HARD AT ALL
HOW OFTEN DO YOU GET TOGETHER WITH FRIENDS OR RELATIVES?: ONCE A WEEK
DO YOU BELONG TO ANY CLUBS OR ORGANIZATIONS SUCH AS CHURCH GROUPS UNIONS, FRATERNAL OR ATHLETIC GROUPS, OR SCHOOL GROUPS?: YES
HOW OFTEN DO YOU ATTENT MEETINGS OF THE CLUB OR ORGANIZATION YOU BELONG TO?: MORE THAN 4 TIMES PER YEAR

## 2021-07-01 ASSESSMENT — PATIENT HEALTH QUESTIONNAIRE - PHQ9
2. FEELING DOWN, DEPRESSED OR HOPELESS: NOT AT ALL
SUM OF ALL RESPONSES TO PHQ9 QUESTIONS 1 & 2: 0
SUM OF ALL RESPONSES TO PHQ QUESTIONS 1-9: 0
SUM OF ALL RESPONSES TO PHQ QUESTIONS 1-9: 0
1. LITTLE INTEREST OR PLEASURE IN DOING THINGS: NOT AT ALL
DEPRESSION UNABLE TO ASSESS: YES
2. FEELING DOWN, DEPRESSED OR HOPELESS: NOT AT ALL
1. LITTLE INTEREST OR PLEASURE IN DOING THINGS: 0
DEPRESSION UNABLE TO ASSESS: YES
DEPRESSION UNABLE TO ASSESS: YES
SUM OF ALL RESPONSES TO PHQ9 QUESTIONS 1 & 2: 0
1. LITTLE INTEREST OR PLEASURE IN DOING THINGS: NOT AT ALL
SUM OF ALL RESPONSES TO PHQ QUESTIONS 1-9: 0
2. FEELING DOWN, DEPRESSED OR HOPELESS: NOT AT ALL
1. LITTLE INTEREST OR PLEASURE IN DOING THINGS: NOT AT ALL
2. FEELING DOWN, DEPRESSED OR HOPELESS: 0

## 2021-07-01 ASSESSMENT — LIFESTYLE VARIABLES
HOW MANY STANDARD DRINKS CONTAINING ALCOHOL DO YOU HAVE ON A TYPICAL DAY: 1 OR 2
HOW OFTEN DO YOU HAVE A DRINK CONTAINING ALCOHOL: 2-4 TIMES A MONTH

## 2021-07-01 NOTE — PROGRESS NOTES
Subjective:      Patient ID: Sergio Mendoza is a 57 y.o. female. HPI     Diabetes Mellitus Type II, Follow-up: Patient here for follow-up of Type 2 diabetes mellitus. Current symptoms/problems include none and have been stable. Symptoms have been present for several years. Known diabetic complications: none  Cardiovascular risk factors: advanced age (older than 54 for men, 72 for women), diabetes mellitus, hypertension and obesity (BMI >= 30 kg/m2)  Current diabetic medications include see med's list .     Eye exam current (within one year): unknown  Weight trend: stable  Prior visit with dietician: no  Current diet: in general, an \"unhealthy\" diet  Current exercise: none    Current monitoring regimen: none  Home blood sugar records: patient does not test  Any episodes of hypoglycemia? no    Is She on ACE inhibitor or angiotensin II receptor blocker? Yes   lisinopril (generic)  Only medication she is currently taking it is Jardiance 10 mg    Hypertension: Patient here for follow-up of elevated blood pressure. She is exercising and is adherent to low salt diet. Blood pressure is well controlled at home. Cardiac symptoms none. Patient denies chest pain, chest pressure/discomfort, exertional chest pressure/discomfort, fatigue, lower extremity edema, near-syncope, orthopnea, palpitations, paroxysmal nocturnal dyspnea, syncope and tachypnea. Cardiovascular risk factors: diabetes mellitus and hypertension. Use of agents associated with hypertension: none. History of target organ damage: none. Hyperlipidemia: Patient presents with hyperlipidemia. She was tested because DM. Her last labs showed Total cholesterol see lab,. none. There is a family history of hyperlipidemia. There is a family history of early ischemia heart disease. Pt with depression doing well on current med's no SE      Review of Systems   Constitutional: Negative. HENT: Negative. Eyes: Negative. Respiratory: Negative. Cardiovascular: Negative. Gastrointestinal: Negative. Endocrine: Negative. Genitourinary: Negative. Musculoskeletal: Negative. Skin: Negative. Allergic/Immunologic: Negative. Neurological: Negative. Hematological: Negative. Psychiatric/Behavioral:. Negative for hallucinations, behavioral problems, confusion, sleep disturbance, self-injury, dysphoric mood and decreased concentration. . The patient is not hyperactive. All other systems reviewed and are negative. Past Medical History:   Diagnosis Date    Hypertension       No past surgical history on file. Family History   Problem Relation Age of Onset    Diabetes Mother     Stroke Mother     High Blood Pressure Mother     Diabetes Father     High Blood Pressure Father     Cancer Father         Brain tumor @age 50     High Blood Pressure Brother     High Cholesterol Brother     Other Brother         Neurosarcoidosis /Brain stem     Social History     Socioeconomic History    Marital status:      Spouse name: Nasima Mata Number of children: 1    Years of education: None    Highest education level: None   Occupational History    Occupation: retired      Comment: office work   Tobacco Use    Smoking status: Never Smoker    Smokeless tobacco: Never Used   Substance and Sexual Activity    Alcohol use: Yes     Alcohol/week: 1.0 standard drinks     Types: 1 Glasses of wine per week    Drug use: No    Sexual activity: Yes     Partners: Male     Comment: M daughter 28 y o   Other Topics Concern    None   Social History Narrative    None     Social Determinants of Health     Financial Resource Strain: Low Risk     Difficulty of Paying Living Expenses: Not hard at all   Food Insecurity: No Food Insecurity    Worried About Running Out of Food in the Last Year: Never true    [address] in the Last Year: Never true   Transportation Needs: No Transportation Needs    Lack of Transportation (Medical):  No    Lack of Transportation (Non-Medical): No   Physical Activity: Sufficiently Active    Days of Exercise per Week: 4 days    Minutes of Exercise per Session: 40 min   Stress: No Stress Concern Present    Feeling of Stress : Not at all   Social Connections: Socially Integrated    Frequency of Communication with Friends and Family: More than three times a week    Frequency of Social Gatherings with Friends and Family: Once a week    Attends [employer]ist Services: More than 4 times per year    Active Member of [employer] Group or Organizations:  Yes    Attends Club or Organization Meetings: More than 4 times per year    Marital Status:    Intimate Partner Violence:     Fear of Current or Ex-Partner:     Emotionally Abused:     Physically Abused:     Sexually Abused:      Current Outpatient Medications   Medication Sig Dispense Refill    hydroCHLOROthiazide (HYDRODIURIL) 25 MG tablet TAKE 1 TABLET BY MOUTH EVERY DAY 90 tablet 1    JARDIANCE 10 MG tablet TAKE 1 TABLET BY MOUTH EVERY DAY 90 tablet 1    amLODIPine (NORVASC) 10 MG tablet TAKE 1 TABLET BY MOUTH EVERY DAY 90 tablet 0    Potassium Chloride ER 8 MEQ CPCR TAKE 1 CAPSULE BY MOUTH TWICE A  capsule 0    atorvastatin (LIPITOR) 10 MG tablet TAKE 1 TABLET BY MOUTH EVERY DAY 90 tablet 0    atorvastatin (LIPITOR) 10 MG tablet TAKE 1 TABLET BY MOUTH DAILY 90 tablet 0    aspirin 81 MG tablet Take 81 mg by mouth daily      Probiotic Product (PROBIOTIC DAILY PO) Take by mouth      EPINEPHrine (EPIPEN 2-CINTHYA) 0.3 MG/0.3ML SOAJ injection Inject 0.3 mLs into the muscle once for 1 dose Use as directed for allergic reaction 0.3 mL 0    BD PEN NEEDLE IRMA U/F 32G X 4 MM MISC USE WITH INSULIN  Package 1    ONE TOUCH LANCETS MISC 1 each by Does not apply route daily 100 each 3    Blood Glucose Monitoring Suppl (ONE TOUCH BASIC SYSTEM) W/DEVICE KIT 1 kit by Does not apply route daily as needed (does not apply) 1 kit 0    glucose blood VI test strips (ASCENSIA  DIABETES FOOT EXAM    Microalbumin / Creatinine Urine Ratio   2. Mixed hyperlipidemia     3.  Hypertension, benign  Basic Metabolic Panel    CBC    Hepatic Function Panel    TSH without Reflex    Lipid Panel     DIABETES EYE EXAM     DIABETES FOOT EXAM    Microalbumin / Creatinine Urine Ratio         Plan:      See orders,   Doing wonderful encourage her to continue with her healthy lifestyle exercise/ eating healthy

## 2021-07-02 DIAGNOSIS — R79.89 ABNORMAL LFTS: Primary | ICD-10-CM

## 2021-07-06 DIAGNOSIS — R79.89 ABNORMAL LFTS: Primary | ICD-10-CM

## 2021-07-07 ENCOUNTER — PATIENT MESSAGE (OUTPATIENT)
Dept: PRIMARY CARE CLINIC | Age: 61
End: 2021-07-07

## 2021-07-07 NOTE — TELEPHONE ENCOUNTER
From: Edith Moreno  To: Radha Marshall MD  Sent: 7/7/2021 11:02 AM EDT  Subject: Test Results Question    I did not see my A1C from last week.

## 2021-07-12 RX ORDER — ATORVASTATIN CALCIUM 10 MG/1
TABLET, FILM COATED ORAL
Qty: 90 TABLET | Refills: 0 | Status: SHIPPED | OUTPATIENT
Start: 2021-07-12 | End: 2021-09-09

## 2021-07-12 NOTE — TELEPHONE ENCOUNTER
Medication:   Requested Prescriptions     Pending Prescriptions Disp Refills    atorvastatin (LIPITOR) 10 MG tablet [Pharmacy Med Name: ATORVASTATIN 10 MG TABLET] 90 tablet 0     Sig: TAKE 1 TABLET BY MOUTH EVERY DAY     Last Filled:  04/12/21    Last appt: 7/1/2021   Next appt: 1/3/2022    Last OARRS: No flowsheet data found.

## 2021-07-13 ENCOUNTER — HOSPITAL ENCOUNTER (OUTPATIENT)
Dept: ULTRASOUND IMAGING | Age: 61
Discharge: HOME | End: 2021-07-13
Payer: COMMERCIAL

## 2021-07-13 DIAGNOSIS — R93.5 ABNORMAL US (ULTRASOUND) OF ABDOMEN: Primary | ICD-10-CM

## 2021-07-13 DIAGNOSIS — R79.89 ABNORMAL LFTS: ICD-10-CM

## 2021-07-13 PROCEDURE — 76705 ECHO EXAM OF ABDOMEN: CPT

## 2021-07-26 ENCOUNTER — HOSPITAL ENCOUNTER (OUTPATIENT)
Dept: CT IMAGING | Age: 61
Discharge: HOME | End: 2021-07-26
Payer: COMMERCIAL

## 2021-07-26 ENCOUNTER — HOSPITAL ENCOUNTER (OUTPATIENT)
Dept: ULTRASOUND IMAGING | Age: 61
Discharge: HOME | End: 2021-07-26
Payer: COMMERCIAL

## 2021-07-26 DIAGNOSIS — R79.89 ABNORMAL LFTS: ICD-10-CM

## 2021-07-26 DIAGNOSIS — R93.5 ABNORMAL US (ULTRASOUND) OF ABDOMEN: ICD-10-CM

## 2021-07-26 PROCEDURE — 76705 ECHO EXAM OF ABDOMEN: CPT

## 2021-07-26 PROCEDURE — 6360000004 HC RX CONTRAST MEDICATION: Performed by: FAMILY MEDICINE

## 2021-07-26 PROCEDURE — 74160 CT ABDOMEN W/CONTRAST: CPT

## 2021-07-26 RX ADMIN — IOHEXOL 50 ML: 240 INJECTION, SOLUTION INTRATHECAL; INTRAVASCULAR; INTRAVENOUS; ORAL at 17:12

## 2021-07-26 RX ADMIN — IOPAMIDOL 80 ML: 755 INJECTION, SOLUTION INTRAVENOUS at 17:12

## 2021-07-28 DIAGNOSIS — R79.89 ABNORMAL LFTS: ICD-10-CM

## 2021-07-28 LAB
ALBUMIN SERPL-MCNC: 4.6 G/DL (ref 3.4–5)
ALP BLD-CCNC: 78 U/L (ref 40–129)
ALT SERPL-CCNC: 77 U/L (ref 10–40)
AST SERPL-CCNC: 78 U/L (ref 15–37)
BILIRUB SERPL-MCNC: 0.9 MG/DL (ref 0–1)
BILIRUBIN DIRECT: <0.2 MG/DL (ref 0–0.3)
BILIRUBIN, INDIRECT: ABNORMAL MG/DL (ref 0–1)
GAMMA GLUTAMYL TRANSFERASE: 209 U/L (ref 5–36)
HAV IGM SER IA-ACNC: NORMAL
TOTAL PROTEIN: 8.1 G/DL (ref 6.4–8.2)

## 2021-07-29 LAB — ANTI-NUCLEAR ANTIBODY (ANA): NEGATIVE

## 2021-08-03 LAB
C282Y HEMOCHROMATOSIS MUT: NORMAL
H63D HEMOCHROMATOSIS MUT: NEGATIVE
HEMOCHROMATOSIS GENE ANALYSIS: NORMAL
HFE PCR SPECIMEN: NORMAL
S65C HEMOCHROMATOSIS MUT: NEGATIVE

## 2021-09-07 ENCOUNTER — TELEPHONE (OUTPATIENT)
Dept: PRIMARY CARE CLINIC | Age: 61
End: 2021-09-07

## 2021-09-07 NOTE — TELEPHONE ENCOUNTER
With the patient she feels better today she is able to walk, feels a little bit better  She made appointment for 9/9 to be seen  Any worsening symptoms may need to refer the emergency room

## 2021-09-08 NOTE — TELEPHONE ENCOUNTER
Medication:   Requested Prescriptions     Pending Prescriptions Disp Refills    Potassium Chloride ER 8 [address] [Pharmacy Med Name: POTASSIUM CL ER 8 MEQ CAPSULE] 180 capsule 0     Sig: TAKE 1 CAPSULE BY MOUTH TWICE A DAY     Last Filled:  6/14/21    Last appt: 7/1/2021   Next appt: 9/9/2021

## 2021-09-09 ENCOUNTER — HOSPITAL ENCOUNTER (OUTPATIENT)
Dept: GENERAL RADIOLOGY | Age: 61
Discharge: HOME | End: 2021-09-09
Payer: COMMERCIAL

## 2021-09-09 ENCOUNTER — OFFICE VISIT (OUTPATIENT)
Dept: PRIMARY CARE CLINIC | Age: 61
End: 2021-09-09
Payer: COMMERCIAL

## 2021-09-09 VITALS
TEMPERATURE: 97.6 F | HEART RATE: 99 BPM | HEIGHT: 66 IN | WEIGHT: 164.2 LBS | DIASTOLIC BLOOD PRESSURE: 74 MMHG | OXYGEN SATURATION: 98 % | SYSTOLIC BLOOD PRESSURE: 122 MMHG | BODY MASS INDEX: 26.39 KG/M2

## 2021-09-09 DIAGNOSIS — R29.898 WEAKNESS OF RIGHT LOWER EXTREMITY: ICD-10-CM

## 2021-09-09 DIAGNOSIS — Z23 NEED FOR INFLUENZA VACCINATION: ICD-10-CM

## 2021-09-09 DIAGNOSIS — R29.898 WEAKNESS OF RIGHT LOWER EXTREMITY: Primary | ICD-10-CM

## 2021-09-09 PROCEDURE — 90674 CCIIV4 VAC NO PRSV 0.5 ML IM: CPT | Performed by: FAMILY MEDICINE

## 2021-09-09 PROCEDURE — 90471 IMMUNIZATION ADMIN: CPT | Performed by: FAMILY MEDICINE

## 2021-09-09 PROCEDURE — 72100 X-RAY EXAM L-S SPINE 2/3 VWS: CPT

## 2021-09-09 PROCEDURE — 99214 OFFICE O/P EST MOD 30 MIN: CPT | Performed by: FAMILY MEDICINE

## 2021-09-09 RX ORDER — EPINEPHRINE 0.3 MG/.3ML
0.3 INJECTION SUBCUTANEOUS ONCE
Qty: 0.3 ML | Refills: 1 | Status: SHIPPED | OUTPATIENT
Start: 2021-09-09 | End: 2022-09-09

## 2021-09-09 RX ORDER — POTASSIUM CHLORIDE 600 MG/1
CAPSULE, EXTENDED RELEASE ORAL
Qty: 180 CAPSULE | Refills: 0 | Status: SHIPPED | OUTPATIENT
Start: 2021-09-09 | End: 2021-12-03

## 2021-09-09 RX ORDER — METHYLPREDNISOLONE 4 MG/1
TABLET ORAL
Qty: 1 KIT | Refills: 0 | Status: SHIPPED | OUTPATIENT
Start: 2021-09-09 | End: 2022-02-01

## 2021-09-09 ASSESSMENT — PATIENT HEALTH QUESTIONNAIRE - PHQ9
2. FEELING DOWN, DEPRESSED OR HOPELESS: NOT AT ALL
1. LITTLE INTEREST OR PLEASURE IN DOING THINGS: NOT AT ALL
SUM OF ALL RESPONSES TO PHQ9 QUESTIONS 1 & 2: 0
DEPRESSION UNABLE TO ASSESS: YES

## 2021-09-09 ASSESSMENT — ENCOUNTER SYMPTOMS
EYES NEGATIVE: 1
RESPIRATORY NEGATIVE: 1
BACK PAIN: 0

## 2021-09-09 NOTE — PROGRESS NOTES
SUBJECTIVE:  Patient ID: Karuna Ramos is a 57 y.o. y.o. female     HPI   Leg pain: Patient presents for presents evaluation for right leg. Symptoms have been present for a few days and include numbness in right leg, pain in right leg  (aching, numbing, sharp and stabbing in character; 4/10 in severity), paresthesias in right leg and weakness in right leg . Initial inciting event: none. Symptoms are worst: morning, mid-day. Alleviating factors identifiable by patient are none. Exacerbating factors identifiable by patient are sitting, standing and walking. Treatments so far initiated by patient: none Previous lower back problems: none. Previous workup: none. Previous treatments: none. Patient was she was walking from her neighbor's house to her home she felt on the ground after her right leg gave out on her and she was able to stand and walk slowly to home since then the pain and the weakness in that leg has improved significantly    Past Medical History:   Diagnosis Date    Hypertension       No past surgical history on file. Family History   Problem Relation Age of Onset    Diabetes Mother     Stroke Mother     High Blood Pressure Mother     Diabetes Father     High Blood Pressure Father     Cancer Father         Brain tumor @age 50     High Blood Pressure Brother     High Cholesterol Brother     Other Brother         Neurosarcoidosis /Brain stem     Social History     Socioeconomic History    Marital status:      Spouse name: Tarah Griffith Number of children: 1    Years of education: Not on file    Highest education level: Not on file   Occupational History    Occupation: retired      Comment: office work   Tobacco Use    Smoking status: Never Smoker    Smokeless tobacco: Never Used   Substance and Sexual Activity    Alcohol use:  Yes     Alcohol/week: 1.0 standard drinks     Types: 1 Glasses of wine per week    Drug use: No    Sexual activity: Yes     Partners: Male     Comment: YOEL daughter 28 y o   Other Topics Concern    Not on file   Social History Narrative    Not on file     Social Determinants of Health     Financial Resource Strain: Low Risk     Difficulty of Paying Living Expenses: Not hard at all   Food Insecurity: No Food Insecurity    Worried About Running Out of Food in the Last Year: Never true    [address] in the Last Year: Never true   Transportation Needs: No Transportation Needs    Lack of Transportation (Medical): No    Lack of Transportation (Non-Medical): No   Physical Activity: Sufficiently Active    Days of Exercise per Week: 4 days    Minutes of Exercise per Session: 40 min   Stress: No Stress Concern Present    Feeling of Stress : Not at all   Social Connections: Socially Integrated    Frequency of Communication with Friends and Family: More than three times a week    Frequency of Social Gatherings with Friends and Family: Once a week    Attends [employer] Services: More than 4 times per year    Active Member of [employer] Group or Organizations:  Yes    Attends Club or Organization Meetings: More than 4 times per year    Marital Status:    Intimate Partner Violence:     Fear of Current or Ex-Partner:     Emotionally Abused:     Physically Abused:     Sexually Abused:      Current Outpatient Medications   Medication Sig Dispense Refill    hydroCHLOROthiazide (HYDRODIURIL) 25 MG tablet TAKE 1 TABLET BY MOUTH EVERY DAY 90 tablet 1    JARDIANCE 10 MG tablet TAKE 1 TABLET BY MOUTH EVERY DAY 90 tablet 1    amLODIPine (NORVASC) 10 MG tablet TAKE 1 TABLET BY MOUTH EVERY DAY 90 tablet 0    Potassium Chloride ER 8 MEQ CPCR TAKE 1 CAPSULE BY MOUTH TWICE A  capsule 0    atorvastatin (LIPITOR) 10 MG tablet TAKE 1 TABLET BY MOUTH DAILY 90 tablet 0    aspirin 81 MG tablet Take 81 mg by mouth daily      Probiotic Product (PROBIOTIC DAILY PO) Take by mouth      BD PEN NEEDLE IRMA U/F 32G X 4 MM MISC USE WITH INSULIN  Package 1    ONE TOUCH LANCETS MISC 1 each by Does not apply route daily 100 each 3    Blood Glucose Monitoring Suppl (ONE TOUCH BASIC SYSTEM) W/DEVICE KIT 1 kit by Does not apply route daily as needed (does not apply) 1 kit 0    glucose blood VI test strips (ASCENSIA AUTODISC VI;ONE TOUCH ULTRA TEST VI) strip 1 each by In Vitro route daily As needed. BRAND ONE TOUCH 100 each 3    atorvastatin (LIPITOR) 10 MG tablet TAKE 1 TABLET BY MOUTH EVERY DAY 90 tablet 0    EPINEPHrine (EPIPEN 2-CINTHYA) 0.3 MG/0.3ML SOAJ injection Inject 0.3 mLs into the muscle once for 1 dose Use as directed for allergic reaction 0.3 mL 0    EPINEPHrine (EPIPEN 2-CINTHYA) 0.3 MG/0.3ML SOAJ injection Inject 0.3 mLs into the skin once for 1 dose Use as directed for allergic reaction 0.3 mL 0     No current facility-administered medications for this visit. No Known Allergies    Review of Systems   Constitutional: Negative. HENT: Negative. Eyes: Negative. Respiratory: Negative. Cardiovascular: Negative. Musculoskeletal: Positive for arthralgias. Negative for back pain, gait problem, joint swelling, myalgias, neck pain and neck stiffness. Neurological: Positive for weakness. OBJECTIVE:  /74 (Site: Right Upper Arm, Position: Sitting, Cuff Size: Small Adult)   Pulse 99   Temp 97.6 °F (36.4 °C) (Oral)   Ht 5' 6\" (1.676 m)   Wt 164 lb 3.2 oz (74.5 kg)   SpO2 98%   BMI 26.50 kg/m²     Physical Exam  Vitals reviewed. Eyes:      General: No scleral icterus. Conjunctiva/sclera: Conjunctivae normal.   Neck:      Thyroid: No thyromegaly. Vascular: No JVD. Cardiovascular:      Rate and Rhythm: Normal rate and regular rhythm. Heart sounds: Normal heart sounds. No murmur heard. No friction rub. No gallop. Pulmonary:      Effort: Pulmonary effort is normal.      Breath sounds: Normal breath sounds. No wheezing or rales. Abdominal:      General: Bowel sounds are normal. There is no distension.       Palpations: Abdomen is

## 2021-09-20 ENCOUNTER — HOSPITAL ENCOUNTER (OUTPATIENT)
Dept: PHYSICAL THERAPY | Age: 61
Setting detail: THERAPIES SERIES
Discharge: HOME | End: 2021-09-20
Payer: COMMERCIAL

## 2021-09-20 PROCEDURE — 97161 PT EVAL LOW COMPLEX 20 MIN: CPT | Performed by: PHYSICAL THERAPIST

## 2021-09-20 RX ORDER — AMLODIPINE BESYLATE 10 MG/1
TABLET ORAL
Qty: 90 TABLET | Refills: 0 | Status: SHIPPED | OUTPATIENT
Start: 2021-09-20 | End: 2021-12-16

## 2021-09-20 NOTE — PLAN OF CARE
[facility] [address]  Phone [phone]  Fax [phone]      Physical Therapy Certification    Dear Referring Practitioner: Krista Graham,    We had the pleasure of evaluating the following patient for physical therapy services at [address] [place] [address]. A summary of our findings can be found in the initial assessment below. This includes our plan of care. If you have any questions or concerns regarding these findings, please do not hesitate to contact me at the office phone number checked above. Thank you for the referral.       Physician Signature:_______________________________Date:__________________  By signing above (or electronic signature), therapists plan is approved by physician      Patient: Elizabet Galicia   : 1960   MRN: [id number]  Referring Physician: Referring Practitioner: Krista Graham      Evaluation Date: 2021      Medical Diagnosis Information:  Diagnosis: R29.898 (ICD-10-CM) - Weakness of right lower extremity                                             Insurance information: PT Insurance Information: $0 copay/20vpcy/requires auth     Precautions/ Contra-indications: none      Latex Allergy:  [x]NO      []YES  Preferred Language for Healthcare:   [x]English       []other:    SUBJECTIVE: Patient stated complaint: Patient reports to clinic today secondary to experiencing a fall approximately  3 weeks ago. Pt cannot recall any reason for the fall. she notes that her right leg just gave out on her. She does not report any previous history of falls or any noted lower extremity weakness. Immediately after the fall she began experiencing discomfort along the right lateral lower leg along with numbness and tingling in the same area. Reports that these symptoms last for approximately 3 days before they completely resolved.   She reports no history of low back, thoracic or cervical pain. No changes noted to bowel/bladder. Of note, she has experienced numbness along her left 4th and 5th digits of her foot for several years. She does have a history of insulin dependent diabetes, however with diet and exercise she no longer requires insulin. She does not experience any numbness/tingling along the rest of the leg or anywhere else along the body. She does exercise regularly including use of pelaton bike and use of weights. She has had x-rays of her lumbar spine. X-rays (+) Degenerative disc disease is mild at L3-4, L4-5 and moderate-severe at L5-S1. There is facet hypertrophy at L4-5 and L5-S1. She has not had any further imaging or testing at this time. She does not note increased weakness with activity. Reports no hip/knee or ankle pain. Reports no saddle paresthesia. She is not currently experiencing any symptoms of any sort.     Relevant Medical History:HTN    C-SSRS Triggered by Intake questionnaire (Past 2 wk assessment):   [x] No, Questionnaire did not trigger screening.   [] Yes, Patient intake triggered further evaluation      [] C-SSRS Screening completed  [] PCP notified via Plan of Care  [] Emergency services notified     Functional Disability Index/G-Codes:     Pain Scale: 0/10  Easing factors: none  Provocative factors: none     Type: []Constant           [x]Intermittent      []Radiating         [x]Localized         []other:                Numbness/Tingling: (+) previously in right shin along the peroneals (not currently experiencing)     Occupation/School: retired     Living Status/Prior Level of Function: Independent with ADLs and IADLs     OBJECTIVE:   ROM   Comments   Trunk flexion WNL    Trunk extension WNL    Trunk R sidebend WNL    Trunk L sidebend WNL    Trunk R rotation WNL    Trunk L rotation WNL    HS flexibility WNL B                       Strength Left Right Comments   Hip flexion(L2) 5 5    Knee extension(L3) 5 5    Knee flexion(S1-2) 5 5    Ankle dorsiflexion(L4) 5 5    Toe extension(L5) 5 5    Ankle eversion/plantar flexion(S1) 5 5    Hip abd   5 5         Special tests   Comments   SLR -     Slump test -     Pelvic symmetry       Segmental Spinal mobility      Heel walk -     Toe walk -     Tandem walk                 DTRs Left Right Comments   Patellar(L3-L4) 2+ 2+     Achilles(S1-S2) 2+ 2+                  Joint mobility:               []Normal               []Hypo              []Hyper     Palpation: (-) TTP along peroneals     Functional Mobility/Transfers: independent     Posture: normal     Bandages/Dressings/Incisions: NA     Gait: (include devices/WB status) normal without AD    Other:  (-) Hoffmans, clonus, dermatomes. SLS: 20 sec bilat   SL squat: normal bilat,  Jose testing: (-) flex and ext,  Hip and knee were cleared for symptoms                          [x] Patient history, allergies, meds reviewed. Medical chart reviewed. See intake form. Review Of Systems (ROS):  [x]Performed Review of systems (Integumentary, CardioPulmonary, Neurological) by intake and observation. Intake form has been scanned into medical record. Patient has been instructed to contact their primary care physician regarding ROS issues if not already being addressed at this time.        Co-morbidities/Complexities (which will affect course of rehabilitation):   []None              Arthritic conditions   []Rheumatoid arthritis (M05.9)  []Osteoarthritis (M19.91)    Cardiovascular conditions   [x]Hypertension (I10)  []Hyperlipidemia (E78.5)  []Angina pectoris (I20)  []Atherosclerosis (I70)    Musculoskeletal conditions   []Disc pathology   []Congenital spine pathologies   []Prior surgical intervention  []Osteoporosis (M81.8)  []Osteopenia (M85.8)   Endocrine conditions   []Hypothyroid (E03.9)  []Hyperthyroid Gastrointestinal conditions   []Constipation (H97.37)    Metabolic conditions   []Morbid obesity (E66.01)  []Diabetes type 1(E10.65) or 2 (E11.65)   []Neuropathy ability to forward bend              []Reduced ability to ambulate prolonged functional periods/distances/surfaces              []Reduced ability to ascend/descend stairs              []other:       Participation Restrictions              []Reduced participation in self care activities              []Reduced participation in home management activities              []Reduced participation in work activities              []Reduced participation in social activities. []Reduced participation in sport/recreational activities. Classification:              []Signs/symptoms consistent with Lumbar instability/stabilization subgroup. []Signs/symptoms consistent with Lumbar mobilization/manipulation subgroup, myotomes and dermatomes intact. Meets manipulation criteria. []Signs/symptoms consistent with Lumbar direction specific/centralization subgroup              []Signs/symptoms consistent with Lumbar traction subgroup                            []Signs/symptoms consistent with lumbar facet dysfunction              []Signs/symptoms consistent with lumbar stenosis type dysfunction              []Signs/symptoms consistent with nerve root involvement including myotome & dermatome dysfunction              []Signs/symptoms consistent with post-surgical status including: decreased ROM, strength and function. []signs/symptoms consistent with pathology which may benefit from Dry needling                [x]other: All musculoskeletal and neurological screenings were (-). We were unable to identify any problems to address today. Pt has not experienced any issues since the initial onset. Pt was advised to monitor symptoms and to contact MD/PT if any issues arise.      Prognosis/Rehab Potential:                                       []Excellent              [x]Good                 []Fair              []Poor     Tolerance of evaluation/treatment:               []Excellent [x]Good                 []Fair              []Poor     Physical Therapy Evaluation Complexity Justification  [x] A history of present problem with:  [] no personal factors and/or comorbidities that impact the plan of care;  [x]1-2 personal factors and/or comorbidities that impact the plan of care  []3 personal factors and/or comorbidities that impact the plan of care  [x] An examination of body systems using standardized tests and measures addressing any of the following: body structures and functions (impairments), activity limitations, and/or participation restrictions;:  [] a total of 1-2 or more elements   [] a total of 3 or more elements   [] a total of 4 or more elements   [x] A clinical presentation with:  [x] stable and/or uncomplicated characteristics   [] evolving clinical presentation with changing characteristics  [] unstable and unpredictable characteristics;   [x] Clinical decision making of [x] low, [] moderate, [] high complexity using standardized patient assessment instrument and/or measurable assessment of functional outcome. [x] EVAL (LOW) 58982 (typically 20 minutes face-to-face)  [] EVAL (MOD) 46352 (typically 30 minutes face-to-face)  [] EVAL (HIGH) 35798 (typically 45 minutes face-to-face)  [] RE-EVAL            PLAN:      Frequency/Duration:  2 days per week for 6 Weeks:  Interventions:  []  Therapeutic exercise including: strength training, ROM, for LE, Glutes and core   []  NMR activation and proprioception for glutes , LE and Core   []  Manual therapy as indicated for Hip complex, LE and spine to include: Dry Needling/IASTM, STM, PROM, Gr I-IV mobilizations, manipulation. []  Modalities as needed that may include: thermal agents, E-stim, Biofeedback, US, iontophoresis as indicated  []  Patient education on joint protection, postural re-education, activity modification, progression of HEP. HEP instruction: none provided secondary to lack of deficits to address. Electronically signed by: Anabela Vu, PT

## 2021-09-20 NOTE — TELEPHONE ENCOUNTER
Medication:   Requested Prescriptions     Pending Prescriptions Disp Refills    amLODIPine (NORVASC) 10 MG tablet [Pharmacy Med Name: AMLODIPINE BESYLATE 10 MG TAB] 90 tablet 0     Sig: TAKE 1 TABLET BY MOUTH EVERY DAY     Last Filled:  6/24/21    Last appt: 9/9/2021   Next appt: 1/3/2022    Last Labs DM:   Lab Results   Component Value Date    LABA1C 5.4 03/05/2021     Last Lipid:   Lab Results   Component Value Date    CHOL 244 07/01/2021    TRIG 68 07/01/2021     07/01/2021    [address] 07/01/2021     Last Thyroid:   Lab Results   Component Value Date    TSH 1.14 07/01/2021

## 2021-10-11 RX ORDER — ATORVASTATIN CALCIUM 10 MG/1
TABLET, FILM COATED ORAL
Qty: 90 TABLET | Refills: 0 | Status: SHIPPED | OUTPATIENT
Start: 2021-10-11 | End: 2022-01-03

## 2021-10-11 NOTE — TELEPHONE ENCOUNTER
Medication:   Requested Prescriptions     Pending Prescriptions Disp Refills    atorvastatin (LIPITOR) 10 MG tablet [Pharmacy Med Name: ATORVASTATIN 10 MG TABLET] 90 tablet 0     Sig: TAKE 1 TABLET BY MOUTH EVERY DAY     Last Filled:  7/12/21    Last appt: 9/9/2021   Next appt: 1/3/2022    Last Lipid:   Lab Results   Component Value Date    CHOL 244 07/01/2021    TRIG 68 07/01/2021     07/01/2021    [address] 07/01/2021

## 2021-10-29 DIAGNOSIS — R93.2 ABNORMAL LIVER DIAGNOSTIC IMAGING: ICD-10-CM

## 2021-10-29 DIAGNOSIS — R74.8 ELEVATED LIVER ENZYMES: Primary | ICD-10-CM

## 2021-11-09 ENCOUNTER — HOSPITAL ENCOUNTER (OUTPATIENT)
Dept: CT IMAGING | Age: 61
Discharge: HOME | End: 2021-11-09
Payer: COMMERCIAL

## 2021-11-09 VITALS
HEART RATE: 90 BPM | DIASTOLIC BLOOD PRESSURE: 80 MMHG | TEMPERATURE: 98.6 F | RESPIRATION RATE: 16 BRPM | OXYGEN SATURATION: 97 % | SYSTOLIC BLOOD PRESSURE: 117 MMHG

## 2021-11-09 DIAGNOSIS — R93.2 ABNORMAL LIVER DIAGNOSTIC IMAGING: ICD-10-CM

## 2021-11-09 DIAGNOSIS — R74.8 ELEVATED LIVER ENZYMES: ICD-10-CM

## 2021-11-09 LAB
APTT: 38.3 SEC (ref 26.2–38.6)
INR BLD: 1.03 (ref 0.88–1.12)
PLATELET # BLD: 198 K/UL (ref 135–450)
PROTHROMBIN TIME: 11.7 SEC (ref 9.9–12.7)

## 2021-11-09 PROCEDURE — 6360000002 HC RX W HCPCS: Performed by: RADIOLOGY

## 2021-11-09 PROCEDURE — 85730 THROMBOPLASTIN TIME PARTIAL: CPT

## 2021-11-09 PROCEDURE — 85610 PROTHROMBIN TIME: CPT

## 2021-11-09 PROCEDURE — 77012 CT SCAN FOR NEEDLE BIOPSY: CPT

## 2021-11-09 PROCEDURE — 88307 TISSUE EXAM BY PATHOLOGIST: CPT

## 2021-11-09 PROCEDURE — 36415 COLL VENOUS BLD VENIPUNCTURE: CPT

## 2021-11-09 PROCEDURE — 85049 AUTOMATED PLATELET COUNT: CPT

## 2021-11-09 PROCEDURE — 99152 MOD SED SAME PHYS/QHP 5/>YRS: CPT

## 2021-11-09 PROCEDURE — 7100000011 HC PHASE II RECOVERY - ADDTL 15 MIN

## 2021-11-09 PROCEDURE — 88313 SPECIAL STAINS GROUP 2: CPT

## 2021-11-09 PROCEDURE — 2500000003 HC RX 250 WO HCPCS: Performed by: RADIOLOGY

## 2021-11-09 PROCEDURE — 7100000010 HC PHASE II RECOVERY - FIRST 15 MIN

## 2021-11-09 RX ORDER — MIDAZOLAM HYDROCHLORIDE 1 MG/ML
1 INJECTION INTRAMUSCULAR; INTRAVENOUS ONCE
Status: COMPLETED | OUTPATIENT
Start: 2021-11-09 | End: 2021-11-09

## 2021-11-09 RX ORDER — FENTANYL CITRATE 50 UG/ML
50 INJECTION, SOLUTION INTRAMUSCULAR; INTRAVENOUS ONCE
Status: COMPLETED | OUTPATIENT
Start: 2021-11-09 | End: 2021-11-09

## 2021-11-09 RX ORDER — ACETAMINOPHEN 325 MG/1
650 TABLET ORAL EVERY 4 HOURS PRN
Status: DISCONTINUED | OUTPATIENT
Start: 2021-11-09 | End: 2021-11-10 | Stop reason: HOSPADM

## 2021-11-09 RX ADMIN — FENTANYL CITRATE 50 MCG: 50 INJECTION, SOLUTION INTRAMUSCULAR; INTRAVENOUS at 11:14

## 2021-11-09 RX ADMIN — MIDAZOLAM HYDROCHLORIDE 1 MG: 2 INJECTION, SOLUTION INTRAMUSCULAR; INTRAVENOUS at 11:15

## 2021-11-09 RX ADMIN — LIDOCAINE HYDROCHLORIDE 5 ML: 20 INJECTION, SOLUTION INFILTRATION; PERINEURAL at 11:15

## 2021-11-09 ASSESSMENT — PAIN SCALES - GENERAL
PAINLEVEL_OUTOF10: 0
PAINLEVEL_OUTOF10: 5
PAINLEVEL_OUTOF10: 0
PAINLEVEL_OUTOF10: 0

## 2021-11-09 NOTE — PROGRESS NOTES
Ambulatory Surgery/Procedure Discharge Note    Vitals:    11/09/21 1300   BP: 117/80   Pulse: 90   Resp: 16   Temp:    SpO2: 97%       In: 180 [P.O.:180]  Out: -     Restroom use offered before discharge. Yes  Assisted pt to BR to void QX X1    Pain assessment:  level of pain (1-10, 10 severe),   Pain Level: 0    1300 Tolerating po well with DC insructions given to pt and S. O. with verbalization of understanding of pt and S.O. Patient discharged to home/self care. Patient discharged via wheel chair by transporter to waiting family/S.O.     1210 No changes noted with VS stable and Drsg remains D&I. Refer to flowsheet. 1135 Received pt from CT with abd drsg D&I. Refer to flowsheet and VS stable. Tolerating po well with S.O. at bedside.

## 2021-12-03 RX ORDER — POTASSIUM CHLORIDE 600 MG/1
CAPSULE, EXTENDED RELEASE ORAL
Qty: 180 CAPSULE | Refills: 0 | Status: SHIPPED | OUTPATIENT
Start: 2021-12-03 | End: 2022-02-28

## 2021-12-03 NOTE — TELEPHONE ENCOUNTER
Medication:   Requested Prescriptions     Pending Prescriptions Disp Refills    Potassium Chloride ER 8 MEQ CPCR [Pharmacy Med Name: POTASSIUM CL ER 8 MEQ CAPSULE] 180 capsule 0     Sig: TAKE 1 CAPSULE BY MOUTH TWICE A DAY       Last appt: 9/9/2021   Next appt: 1/10/2022    Last OARRS: No flowsheet data found.

## 2021-12-08 ENCOUNTER — PATIENT MESSAGE (OUTPATIENT)
Dept: PRIMARY CARE CLINIC | Age: 61
End: 2021-12-08

## 2021-12-08 NOTE — TELEPHONE ENCOUNTER
From: Yanick Duffy  To: Dr. Spivey Markin2021 8:12 AM EST  Subject: Cold Symptoms     I have had a sore throat, moderate painful cough and stuffy nose with sneezing since Saturday. Took a COVID test on Monday and got my results this morning and Im negative. I dont have a temperature. Is there anything that can be prescribed that will help with these symptoms? Ezra Pearce been taking Musinex but not really yielding results.  Rosas Chu

## 2021-12-14 ENCOUNTER — VIRTUAL VISIT (OUTPATIENT)
Dept: PRIMARY CARE CLINIC | Age: 61
End: 2021-12-14
Payer: COMMERCIAL

## 2021-12-14 DIAGNOSIS — J06.9 UPPER RESPIRATORY TRACT INFECTION, UNSPECIFIED TYPE: Primary | ICD-10-CM

## 2021-12-14 DIAGNOSIS — L30.9 ECZEMA, UNSPECIFIED TYPE: ICD-10-CM

## 2021-12-14 DIAGNOSIS — R05.9 COUGH: ICD-10-CM

## 2021-12-14 PROCEDURE — 99213 OFFICE O/P EST LOW 20 MIN: CPT | Performed by: FAMILY MEDICINE

## 2021-12-14 RX ORDER — ALBUTEROL SULFATE 90 UG/1
2 AEROSOL, METERED RESPIRATORY (INHALATION) 4 TIMES DAILY PRN
Qty: 54 G | Refills: 1 | Status: SHIPPED | OUTPATIENT
Start: 2021-12-14 | End: 2022-02-01

## 2021-12-14 RX ORDER — BENZONATATE 200 MG/1
200 CAPSULE ORAL 3 TIMES DAILY PRN
Qty: 30 CAPSULE | Refills: 0 | Status: SHIPPED | OUTPATIENT
Start: 2021-12-14 | End: 2021-12-21

## 2021-12-14 ASSESSMENT — ENCOUNTER SYMPTOMS
EYES NEGATIVE: 1
GASTROINTESTINAL NEGATIVE: 1
SORE THROAT: 1
COUGH: 1

## 2021-12-14 NOTE — PROGRESS NOTES
2021    TELEHEALTH EVALUATION -- Audio/Visual (During GPWJU-05 public health emergency)    HPI:    Lena Spaulding (:  1960) has requested an audio/video evaluation for the following concern(s):    Upper Respiratory Infection: Patient complains of symptoms of a URI, possible sinusitis. Symptoms include congestion, coryza, cough and sore throat. Onset of symptoms was 10 days ago, unchanged since that time. She also c/o congestion, nasal congestion, post nasal drip, sinus pressure and sore throat for the past 10 days . She is drinking plenty of fluids. Evaluation to date: none. Treatment to date: oral decongestant, Acetaminophen, NSAID.  was sick with similar symptoms both of them checked for Covid were negative  She is vaccinated with a booster  She has been experiencing on and off rash the front part of her body itchy according to patient had history of eczema had asked like that  Area  of irritation scattered all over her body on and off, different area of her body. Review of Systems   Constitutional: Negative. HENT: Positive for congestion, postnasal drip and sore throat. Eyes: Negative. Respiratory: Positive for cough. Cardiovascular: Negative. Gastrointestinal: Negative. All other systems reviewed and are negative. Prior to Visit Medications    Medication Sig Taking?  Authorizing Provider   Potassium Chloride ER 8 MEQ CPCR TAKE 1 CAPSULE BY MOUTH TWICE A DAY Yes Qamar Brady MD   atorvastatin (LIPITOR) 10 MG tablet TAKE 1 TABLET BY MOUTH EVERY DAY Yes Qamar Brady MD   amLODIPine (NORVASC) 10 MG tablet TAKE 1 TABLET BY MOUTH Sarmad Baker Yes Qaamr Brady MD   hydroCHLOROthiazide (HYDRODIURIL) 25 MG tablet TAKE 1 TABLET BY MOUTH EVERY DAY Yes Qamar Brady MD   JARDIANCE 10 MG tablet TAKE 1 Vu Flicker Yes Qamar Brady MD   aspirin 81 MG tablet Take 81 mg by mouth daily Yes Historical Provider, MD   Probiotic Product (PROBIOTIC DAILY PO) Take by mouth Yes Historical Provider, MD   BD PEN NEEDLE IRMA U/F 32G X 4 MM MISC USE WITH INSULIN PEN Yes Jennifer Kruse MD   ONE TOUCH LANCETS MISC 1 each by Does not apply route daily Yes Jennifer Kruse MD   Blood Glucose Monitoring Suppl ([address]) W/DEVICE KIT 1 kit by Does not apply route daily as needed (does not apply) Yes Jennifer Kruse MD   glucose blood VI test strips (ASCENSIA AUTODISC VI;ONE TOUCH ULTRA TEST VI) strip 1 each by In Vitro route daily As needed. Lou Hector MD   methylPREDNISolone (MEDROL, CINTHYA,) 4 MG tablet Take by mouth per package instruction  Patient not taking: Reported on 12/14/2021  Jennifer Kruse MD   EPINEPHrine (EPIPEN 2-CINTHYA) 0.3 MG/0.3ML SOAJ injection Inject 0.3 mLs into the skin once for 1 dose Use as directed for allergic reaction  Jennifer Kruse MD   EPINEPHrine (EPIPEN 2-CINTHYA) 0.3 MG/0.3ML SOAJ injection Inject 0.3 mLs into the muscle once for 1 dose Use as directed for allergic reaction  Tatyana Lee MD   EPINEPHrine (EPIPEN 2-CINTHYA) 0.3 MG/0.3ML SOAJ injection Inject 0.3 mLs into the skin once for 1 dose Use as directed for allergic reaction  Jennifer Kruse MD       Social History     Tobacco Use    Smoking status: Never Smoker    Smokeless tobacco: Never Used   Substance Use Topics    Alcohol use: Yes     Alcohol/week: 1.0 standard drink     Types: 1 Glasses of wine per week    Drug use: No        No Known Allergies,   Past Medical History:   Diagnosis Date    Hypertension    ,   Past Surgical History:   Procedure Laterality Date    CT NEEDLE BIOPSY LIVER PERCUTANEOUS  11/9/2021    CT NEEDLE BIOPSY LIVER PERCUTANEOUS 11/9/2021 [facility] CT SCAN   ,   Social History     Tobacco Use    Smoking status: Never Smoker    Smokeless tobacco: Never Used   Substance Use Topics    Alcohol use:  Yes     Alcohol/week: 1.0 standard drink     Types: 1 Glasses of wine per week    Drug use: No   ,   Family History   Problem Relation Age of Onset    Diabetes Mother     Stroke Mother    [place] High Blood Pressure Mother     Diabetes Father     High Blood Pressure Father     Cancer Father         Brain tumor @age 50     High Blood Pressure Brother     High Cholesterol Brother     Other Brother         Neurosarcoidosis /Brain stem   ,   Immunization History   Administered Date(s) Administered    COVID-19, Navneet Gomez, Primary or Immunocompromised, PF, 100mcg/0.5mL 03/09/2021, 04/06/2021, 10/25/2021    Hepatitis B Adult (Engerix-B) 02/24/2020, 03/24/2020, 08/27/2020    Influenza Virus Vaccine 09/17/2020    Influenza, MDCK Quadv, IM, PF (Flucelvax 2 yrs and older) 09/09/2021    Pneumococcal Conjugate 13-valent (Pauboex98) 04/05/2016    Pneumococcal Polysaccharide (Ttpetxivx13) 02/24/2020    Tdap (Boostrix, Adacel) 04/10/2014, 04/05/2016    Zoster Recombinant (Shingrix) 11/06/2018, 03/04/2019, 03/05/2019   ,   Health Maintenance   Topic Date Due    Cervical cancer screen  03/01/2021    A1C test (Diabetic or Prediabetic)  03/05/2022    Diabetic foot exam  07/01/2022    Diabetic microalbuminuria test  07/01/2022    Lipid screen  07/01/2022    Potassium monitoring  07/01/2022    Creatinine monitoring  07/01/2022    Breast cancer screen  01/07/2023    Diabetic retinal exam  05/05/2023    Pneumococcal 0-64 years Vaccine (2 of 2 - PPSV23) 02/24/2025    DTaP/Tdap/Td vaccine (3 - Td or Tdap) 04/05/2026    Colon cancer screen colonoscopy  11/25/2030    Flu vaccine  Completed    Shingles Vaccine  Completed    COVID-19 Vaccine  Completed    Hepatitis C screen  Completed    HIV screen  Completed    Hepatitis A vaccine  Aged Out    Hib vaccine  Aged Out    Meningococcal (ACWY) vaccine  Aged Out       PHYSICAL EXAMINATION:  [ INSTRUCTIONS:  \"[x]\" Indicates a positive item  \"[]\" Indicates a negative item  -- DELETE ALL ITEMS NOT EXAMINED]  Vital Signs: (As obtained by patient/caregiver or practitioner observation)    Blood pressure-  Heart rate-    Respiratory rate-    Temperature-  Pulse Act.  Patient identification was verified, and a caregiver was present when appropriate. The patient was located in a state where the provider was credentialed to provide care. Total time spent on this encounter: Not billed by time    --Kandy Castro MD on 12/14/2021 at 3:43 PM    An electronic signature was used to authenticate this note.

## 2021-12-15 NOTE — TELEPHONE ENCOUNTER
Medication:   Requested Prescriptions     Pending Prescriptions Disp Refills    amLODIPine (NORVASC) 10 MG tablet [Pharmacy Med Name: AMLODIPINE BESYLATE 10 MG TAB] 90 tablet 0     Sig: TAKE 1 TABLET BY MOUTH EVERY DAY     Last Filled:  9/20/21    Last appt: 12/14/2021   Next appt: 1/10/2022    Last Labs DM:   Lab Results   Component Value Date    LABA1C 5.4 03/05/2021     Last Lipid:   Lab Results   Component Value Date    CHOL 244 07/01/2021    TRIG 68 07/01/2021     07/01/2021    [address] 07/01/2021     Last PSA: No results found for: PSA  Last Thyroid:   Lab Results   Component Value Date    TSH 1.14 07/01/2021

## 2021-12-16 RX ORDER — AMLODIPINE BESYLATE 10 MG/1
TABLET ORAL
Qty: 90 TABLET | Refills: 0 | Status: SHIPPED | OUTPATIENT
Start: 2021-12-16 | End: 2022-03-14

## 2021-12-29 ENCOUNTER — TELEPHONE (OUTPATIENT)
Dept: PRIMARY CARE CLINIC | Age: 61
End: 2021-12-29

## 2021-12-29 NOTE — TELEPHONE ENCOUNTER
----- Message from Shabana Gar sent at 12/29/2021 10:08 AM EST -----  Subject: Appointment Request    Reason for Call: Routine Existing Condition Follow Up (Diabetes)    QUESTIONS  Type of Appointment? Established Patient  Reason for appointment request? Other - The message in [employer] said I   had to send message and couldn't personally reschedule. Additional Information for Provider? The pt requested that her appt on   1/5/22 be rescheduled to the first week of February. When trying to   schedule it said I wasn't able to and that I had to send a message to   provider. Please contact the pt.  ---------------------------------------------------------------------------  --------------  CALL BACK INFO  What is the best way for the office to contact you? OK to leave message on   voicemail  Preferred Call Back Phone Number? [phone]  ---------------------------------------------------------------------------  --------------  SCRIPT ANSWERS  Relationship to Patient? Self  Have your symptoms changed? No  Is this follow up request related to routine Diabetes Management? Yes  Have you been diagnosed with, awaiting test results for, or told that you   are suspected of having COVID-19 (Coronavirus)? (If patient has tested   negative or was tested as a requirement for work, school, or travel and   not based on symptoms, answer no)? No  Within the past two weeks have you developed any of the following symptoms   (answer no if symptoms have been present longer than 2 weeks or began   more than 2 weeks ago)? Fever or Chills, Cough, Shortness of breath or   difficulty breathing, Loss of taste or smell, Sore throat, Nasal   congestion, Sneezing or runny nose, Fatigue or generalized body aches   (answer no if pain is specific to a body part e.g. back pain), Diarrhea,   Headache? No  Have you had close contact with someone with COVID-19 in the last 14 days?    No  (Service Expert  click yes below to proceed with [employer] As Usual   Scheduling)?  Yes

## 2022-01-03 RX ORDER — HYDROCHLOROTHIAZIDE 25 MG/1
TABLET ORAL
Qty: 90 TABLET | Refills: 0 | Status: SHIPPED | OUTPATIENT
Start: 2022-01-03 | End: 2022-03-28

## 2022-01-03 RX ORDER — EMPAGLIFLOZIN 10 MG/1
TABLET, FILM COATED ORAL
Qty: 90 TABLET | Refills: 0 | Status: SHIPPED | OUTPATIENT
Start: 2022-01-03 | End: 2022-08-15

## 2022-01-03 RX ORDER — ATORVASTATIN CALCIUM 10 MG/1
TABLET, FILM COATED ORAL
Qty: 90 TABLET | Refills: 0 | Status: SHIPPED | OUTPATIENT
Start: 2022-01-03 | End: 2022-03-28

## 2022-01-03 NOTE — TELEPHONE ENCOUNTER
Medication:   Requested Prescriptions     Pending Prescriptions Disp Refills    JARDIANCE 10 MG tablet [Pharmacy Med Name: Lydia Sous 10 MG TABLET] 90 tablet 1     Sig: TAKE 1 TABLET BY MOUTH EVERY DAY    hydroCHLOROthiazide (HYDRODIURIL) 25 MG tablet [Pharmacy Med Name: HYDROCHLOROTHIAZIDE 25 MG TAB] 90 tablet 1     Sig: TAKE 1 TABLET BY MOUTH EVERY DAY     Last Filled:  6/28/21    Last appt: 12/14/2021   Next appt: 2/1/22    Last Labs DM:   Lab Results   Component Value Date    LABA1C 5.4 03/05/2021     Last Lipid:   Lab Results   Component Value Date    CHOL 244 07/01/2021    TRIG 68 07/01/2021     07/01/2021    [address] 07/01/2021     Last Thyroid:   Lab Results   Component Value Date    TSH 1.14 07/01/2021

## 2022-01-03 NOTE — TELEPHONE ENCOUNTER
Medication:   Requested Prescriptions     Pending Prescriptions Disp Refills    atorvastatin (LIPITOR) 10 MG tablet [Pharmacy Med Name: ATORVASTATIN 10 MG TABLET] 90 tablet 0     Sig: TAKE 1 TABLET BY MOUTH EVERY DAY     Last Filled:  10/11/21    Last appt: 12/14/2021   Next appt: 2/1/2022    Last Lipid:   Lab Results   Component Value Date    CHOL 244 07/01/2021    TRIG 68 07/01/2021     07/01/2021    [address] 07/01/2021

## 2022-01-05 ENCOUNTER — HOSPITAL ENCOUNTER (OUTPATIENT)
Dept: WOMENS IMAGING | Age: 62
Discharge: HOME | End: 2022-01-05
Payer: COMMERCIAL

## 2022-01-05 VITALS — WEIGHT: 160 LBS | BODY MASS INDEX: 25.71 KG/M2 | HEIGHT: 66 IN

## 2022-01-05 DIAGNOSIS — Z12.31 VISIT FOR SCREENING MAMMOGRAM: ICD-10-CM

## 2022-01-05 PROCEDURE — 77063 BREAST TOMOSYNTHESIS BI: CPT

## 2022-01-10 ENCOUNTER — TELEPHONE (OUTPATIENT)
Dept: PRIMARY CARE CLINIC | Age: 62
End: 2022-01-10

## 2022-01-10 DIAGNOSIS — R05.9 COUGH: Primary | ICD-10-CM

## 2022-01-10 RX ORDER — ALBUTEROL SULFATE 90 UG/1
2 AEROSOL, METERED RESPIRATORY (INHALATION) 4 TIMES DAILY PRN
Qty: 54 G | Refills: 0 | Status: SHIPPED | OUTPATIENT
Start: 2022-01-10

## 2022-01-10 NOTE — TELEPHONE ENCOUNTER
Pt was seen in office on 12/14/21  and was told to call back with update   Pt states she is still having a persistent cough  Some clear phlegm   tried Mucinex at night , Patient denies any other symptoms   She finish taken the benzonatate (TESSALON) 200 MG capsule    Pleas advise

## 2022-01-11 ENCOUNTER — HOSPITAL ENCOUNTER (OUTPATIENT)
Dept: GENERAL RADIOLOGY | Age: 62
Discharge: HOME | End: 2022-01-11
Payer: COMMERCIAL

## 2022-01-11 DIAGNOSIS — R05.9 COUGH: ICD-10-CM

## 2022-01-11 PROCEDURE — 71046 X-RAY EXAM CHEST 2 VIEWS: CPT

## 2022-01-25 DIAGNOSIS — Z00.00 ANNUAL PHYSICAL EXAM: Primary | ICD-10-CM

## 2022-01-25 DIAGNOSIS — E11.9 TYPE 2 DIABETES MELLITUS WITHOUT COMPLICATION, WITHOUT LONG-TERM CURRENT USE OF INSULIN (HCC): ICD-10-CM

## 2022-01-28 DIAGNOSIS — E11.9 TYPE 2 DIABETES MELLITUS WITHOUT COMPLICATION, WITHOUT LONG-TERM CURRENT USE OF INSULIN (HCC): ICD-10-CM

## 2022-01-28 DIAGNOSIS — Z00.00 ANNUAL PHYSICAL EXAM: ICD-10-CM

## 2022-01-28 LAB
ALBUMIN SERPL-MCNC: 4.3 G/DL (ref 3.4–5)
ALP BLD-CCNC: 78 U/L (ref 40–129)
ALT SERPL-CCNC: 41 U/L (ref 10–40)
ANION GAP SERPL CALCULATED.3IONS-SCNC: 14 MMOL/L (ref 3–16)
AST SERPL-CCNC: 49 U/L (ref 15–37)
BILIRUB SERPL-MCNC: 0.8 MG/DL (ref 0–1)
BILIRUBIN DIRECT: <0.2 MG/DL (ref 0–0.3)
BILIRUBIN, INDIRECT: ABNORMAL MG/DL (ref 0–1)
BUN BLDV-MCNC: 11 MG/DL (ref 7–20)
CALCIUM SERPL-MCNC: 9.3 MG/DL (ref 8.3–10.6)
CHLORIDE BLD-SCNC: 103 MMOL/L (ref 99–110)
CHOLESTEROL, TOTAL: 135 MG/DL (ref 0–199)
CO2: 23 MMOL/L (ref 21–32)
CREAT SERPL-MCNC: 0.5 MG/DL (ref 0.6–1.2)
GFR AFRICAN AMERICAN: >60
GFR NON-AFRICAN AMERICAN: >60
GLUCOSE BLD-MCNC: 113 MG/DL (ref 70–99)
HCT VFR BLD CALC: 41.8 % (ref 36–48)
HDLC SERPL-MCNC: 62 MG/DL (ref 40–60)
HEMOGLOBIN: 13.9 G/DL (ref 12–16)
LDL CHOLESTEROL CALCULATED: 59 MG/DL
MCH RBC QN AUTO: 31.7 PG (ref 26–34)
MCHC RBC AUTO-ENTMCNC: 33.2 G/DL (ref 31–36)
MCV RBC AUTO: 95.5 FL (ref 80–100)
PDW BLD-RTO: 12.4 % (ref 12.4–15.4)
PLATELET # BLD: 209 K/UL (ref 135–450)
PMV BLD AUTO: 8.9 FL (ref 5–10.5)
POTASSIUM SERPL-SCNC: 3.8 MMOL/L (ref 3.5–5.1)
RBC # BLD: 4.38 M/UL (ref 4–5.2)
SODIUM BLD-SCNC: 140 MMOL/L (ref 136–145)
TOTAL PROTEIN: 7.9 G/DL (ref 6.4–8.2)
TRIGL SERPL-MCNC: 69 MG/DL (ref 0–150)
TSH SERPL DL<=0.05 MIU/L-ACNC: 1.58 UIU/ML (ref 0.27–4.2)
VLDLC SERPL CALC-MCNC: 14 MG/DL
WBC # BLD: 4.4 K/UL (ref 4–11)

## 2022-01-29 LAB
ESTIMATED AVERAGE GLUCOSE: 128.4 MG/DL
HBA1C MFR BLD: 6.1 %

## 2022-02-01 ENCOUNTER — OFFICE VISIT (OUTPATIENT)
Dept: PRIMARY CARE CLINIC | Age: 62
End: 2022-02-01
Payer: COMMERCIAL

## 2022-02-01 VITALS
OXYGEN SATURATION: 98 % | HEART RATE: 98 BPM | WEIGHT: 157.6 LBS | HEIGHT: 66 IN | DIASTOLIC BLOOD PRESSURE: 64 MMHG | SYSTOLIC BLOOD PRESSURE: 122 MMHG | TEMPERATURE: 98.2 F | RESPIRATION RATE: 12 BRPM | BODY MASS INDEX: 25.33 KG/M2

## 2022-02-01 DIAGNOSIS — Z00.00 ANNUAL PHYSICAL EXAM: Primary | ICD-10-CM

## 2022-02-01 DIAGNOSIS — E11.9 TYPE 2 DIABETES MELLITUS WITHOUT COMPLICATION, WITHOUT LONG-TERM CURRENT USE OF INSULIN (HCC): ICD-10-CM

## 2022-02-01 PROCEDURE — 99396 PREV VISIT EST AGE 40-64: CPT | Performed by: FAMILY MEDICINE

## 2022-02-01 RX ORDER — METFORMIN HYDROCHLORIDE 500 MG/1
500 TABLET, EXTENDED RELEASE ORAL
Qty: 30 TABLET | Refills: 2 | Status: SHIPPED | OUTPATIENT
Start: 2022-02-01 | End: 2022-03-23 | Stop reason: SDUPTHER

## 2022-02-01 NOTE — PROGRESS NOTES
Subjective:      Talisha Rhoades is a 57 y.o. female who presents for an annual exam. The patient has no complaints today. The patient is sexually active. Wears seatbelts: yes last pap: was normal  Regular exercise: yes  Ever been transfused or tattooed?: no  The patient reports that domestic violence in her life is absent. Menstrual History:  OB History        1    Para   1    Term   1            AB        Living           SAB        IAB        Ectopic        Molar        Multiple        Live Births                   Menarche age: 14  No LMP recorded.  Patient is postmenopausal.     Patient with non-insulin-dependent diabetes she has been doing really well currently on Jardiance only is very expensive she will start something else he had some mild diarrhea in the past with Metformin many many years ago so I recommend the patient to try extended release prescription will be given that she will check blood work in 3 months  Had abnormal liver function test seems getting better she was established with GI did a biopsy thinks improving  Past Medical History:   Diagnosis Date    Hypertension      Patient Active Problem List    Diagnosis Date Noted    Fatty liver 2018    Depression 2015    Type II diabetes mellitus ([facility] [address] 2013    Hearing loss 2011    Candidiasis of vulva and vagina 2010    Hypertension, benign 10/01/2009    Patient overweight 2009    Menopausal and postmenopausal disorder 2009    Urge incontinence 2009    Allergic rhinitis 2008     Past Surgical History:   Procedure Laterality Date    CT NEEDLE BIOPSY LIVER PERCUTANEOUS  2021    CT NEEDLE BIOPSY LIVER PERCUTANEOUS 2021 [facility] CT SCAN     Family History   Problem Relation Age of Onset    Diabetes Mother     Stroke Mother     High Blood Pressure Mother     Diabetes Father     High Blood Pressure Father     Cancer Father         Brain tumor @age 50     High Blood Pressure Brother     High Cholesterol Brother     Other Brother         Neurosarcoidosis /Brain stem    Breast Cancer Paternal Cousin      Social History     Socioeconomic History    Marital status:      Spouse name: Yuri Stein Number of children: 1    Years of education: Not on file    Highest education level: Not on file   Occupational History    Occupation: retired      Comment: office work   Tobacco Use    Smoking status: Never Smoker    Smokeless tobacco: Never Used   Vaping Use    Vaping Use: Not on file   Substance and Sexual Activity    Alcohol use: Yes     Alcohol/week: 1.0 standard drink     Types: 1 Glasses of wine per week    Drug use: No    Sexual activity: Yes     Partners: Male     Comment: M daughter 28 y o   Other Topics Concern    Not on file   Social History Narrative    Not on file     Social Determinants of Health     Financial Resource Strain: Low Risk     Difficulty of Paying Living Expenses: Not hard at all   Food Insecurity: No Food Insecurity    Worried About [facility] in the Last Year: Never true    [facility] in the Last Year: Never true   Transportation Needs: No Transportation Needs    Lack of Transportation (Medical): No    Lack of Transportation (Non-Medical): No   Physical Activity: Sufficiently Active    Days of Exercise per Week: 4 days    Minutes of Exercise per Session: 40 min   Stress: No Stress Concern Present    Feeling of Stress : Not at all   Social Connections: Socially Integrated    Frequency of Communication with Friends and Family: More than three times a week    Frequency of Social Gatherings with Friends and Family: Once a week    Attends Orthodox Services: More than 4 times per year    Active Member of [address] [place] [address] or Organizations:  Yes    Attends Club or Organization Meetings: More than 4 times per year    Marital Status:    Intimate Partner Violence:     Fear of Current or Ex-Partner: Not on file    Emotionally Abused: Not on file    Physically Abused: Not on file    Sexually Abused: Not on file   Housing Stability: Low Risk     Unable to Pay for Housing in the Last Year: No    Number of Places Lived in the Last Year: 1    Unstable Housing in the Last Year: No     Current Outpatient Medications   Medication Sig Dispense Refill    metFORMIN (GLUCOPHAGE-XR) 500 MG extended release tablet Take 1 tablet by mouth daily (with breakfast) 30 tablet 2    albuterol sulfate HFA (VENTOLIN HFA) 108 (90 Base) MCG/ACT inhaler Inhale 2 puffs into the lungs 4 times daily as needed for Wheezing or Shortness of Breath (cough) 54 g 0    JARDIANCE 10 MG tablet TAKE 1 TABLET BY MOUTH EVERY DAY 90 tablet 0    hydroCHLOROthiazide (HYDRODIURIL) 25 MG tablet TAKE 1 TABLET BY MOUTH EVERY DAY 90 tablet 0    atorvastatin (LIPITOR) 10 MG tablet TAKE 1 TABLET BY MOUTH EVERY DAY 90 tablet 0    amLODIPine (NORVASC) 10 MG tablet TAKE 1 TABLET BY MOUTH EVERY DAY 90 tablet 0    Potassium Chloride ER 8 MEQ CPCR TAKE 1 CAPSULE BY MOUTH TWICE A  capsule 0    aspirin 81 MG tablet Take 81 mg by mouth daily      Probiotic Product (PROBIOTIC DAILY PO) Take by mouth      BD PEN NEEDLE IRMA U/F 32G X 4 MM MISC USE WITH INSULIN  Package 1    ONE TOUCH LANCETS MISC 1 each by Does not apply route daily 100 each 3    Blood Glucose Monitoring Suppl (ONE TOUCH BASIC SYSTEM) W/DEVICE KIT 1 kit by Does not apply route daily as needed (does not apply) 1 kit 0    glucose blood VI test strips (ASCENSIA AUTODISC VI;ONE TOUCH ULTRA TEST VI) strip 1 each by In Vitro route daily As needed.  BRAND ONE TOUCH 100 each 3    albuterol sulfate HFA (VENTOLIN HFA) 108 (90 Base) MCG/ACT inhaler Inhale 2 puffs into the lungs 4 times daily as needed for Wheezing 54 g 1    methylPREDNISolone (MEDROL, CINTHYA,) 4 MG tablet Take by mouth per package instruction (Patient not taking: Reported on 2/1/2022) 1 kit 0    EPINEPHrine (EPIPEN 2-CINTHYA) 0.3 MG/0.3ML SOAJ injection Inject 0.3 mLs into the skin once for 1 dose Use as directed for allergic reaction 0.3 mL 1    EPINEPHrine (EPIPEN 2-CINTHYA) 0.3 MG/0.3ML SOAJ injection Inject 0.3 mLs into the muscle once for 1 dose Use as directed for allergic reaction 0.3 mL 0     No current facility-administered medications for this visit. Current Outpatient Medications on File Prior to Visit   Medication Sig Dispense Refill    albuterol sulfate HFA (VENTOLIN HFA) 108 (90 Base) MCG/ACT inhaler Inhale 2 puffs into the lungs 4 times daily as needed for Wheezing or Shortness of Breath (cough) 54 g 0    JARDIANCE 10 MG tablet TAKE 1 TABLET BY MOUTH EVERY DAY 90 tablet 0    hydroCHLOROthiazide (HYDRODIURIL) 25 MG tablet TAKE 1 TABLET BY MOUTH EVERY DAY 90 tablet 0    atorvastatin (LIPITOR) 10 MG tablet TAKE 1 TABLET BY MOUTH EVERY DAY 90 tablet 0    amLODIPine (NORVASC) 10 MG tablet TAKE 1 TABLET BY MOUTH EVERY DAY 90 tablet 0    Potassium Chloride ER 8 MEQ CPCR TAKE 1 CAPSULE BY MOUTH TWICE A  capsule 0    aspirin 81 MG tablet Take 81 mg by mouth daily      Probiotic Product (PROBIOTIC DAILY PO) Take by mouth      BD PEN NEEDLE IRMA U/F 32G X 4 MM MISC USE WITH INSULIN  Package 1    ONE TOUCH LANCETS MISC 1 each by Does not apply route daily 100 each 3    Blood Glucose Monitoring Suppl (ONE TOUCH BASIC SYSTEM) W/DEVICE KIT 1 kit by Does not apply route daily as needed (does not apply) 1 kit 0    glucose blood VI test strips (ASCENSIA AUTODISC VI;ONE TOUCH ULTRA TEST VI) strip 1 each by In Vitro route daily As needed.  BRAND ONE TOUCH 100 each 3    albuterol sulfate HFA (VENTOLIN HFA) 108 (90 Base) MCG/ACT inhaler Inhale 2 puffs into the lungs 4 times daily as needed for Wheezing 54 g 1    methylPREDNISolone (MEDROL, CINTHYA,) 4 MG tablet Take by mouth per package instruction (Patient not taking: Reported on 2/1/2022) 1 kit 0    EPINEPHrine (EPIPEN 2-CINTHYA) 0.3 MG/0.3ML SOAJ injection Inject 0.3 mLs into the skin once for 1 dose Use as directed for allergic reaction 0.3 mL 1    EPINEPHrine (EPIPEN 2-CINTHYA) 0.3 MG/0.3ML SOAJ injection Inject 0.3 mLs into the muscle once for 1 dose Use as directed for allergic reaction 0.3 mL 0     No current facility-administered medications on file prior to visit. No Known Allergies    Review of Systems  Pertinent items are noted in HPI.       Objective:      /64 (Site: Right Upper Arm, Position: Sitting, Cuff Size: Small Adult)   Pulse 98   Temp 98.2 °F (36.8 °C) (Temporal)   Resp 12   Ht 5' 6\" (1.676 m)   Wt 157 lb 9.6 oz (71.5 kg)   SpO2 98%   BMI 25.44 kg/m²     General Appearance:    Alert, cooperative, no distress, appears stated age   Head:    Normocephalic, without obvious abnormality, atraumatic   Eyes:    PERRL, conjunctiva/corneas clear, EOM's intact, fundi     benign, both eyes   Ears:    Normal TM's and external ear canals, both ears   Nose:   Nares normal, septum midline, mucosa normal, no drainage    or sinus tenderness   Throat:   Lips, mucosa, and tongue normal; teeth and gums normal   Neck:   Supple, symmetrical, trachea midline, no adenopathy;     thyroid:  no enlargement/tenderness/nodules; no carotid    bruit or JVD   Back:     Symmetric, no curvature, ROM normal, no CVA tenderness   Lungs:     Clear to auscultation bilaterally, respirations unlabored   Chest Wall:    No tenderness or deformity    Heart:    Regular rate and rhythm, S1 and S2 normal, no murmur, rub   or gallop   Breast Exam:    No tenderness, masses, or nipple abnormality   Abdomen:     Soft, non-tender, bowel sounds active all four quadrants,     no masses, no organomegaly   Genitalia:    Normal female without lesion, discharge or tenderness   Rectal:    Normal tone ;guaiac negative stool   Extremities:   Extremities normal, atraumatic, no cyanosis or edema   Pulses:   2+ and symmetric all extremities   Skin:   Skin color, texture, turgor normal, no rashes or lesions   Lymph nodes: Cervical, supraclavicular, and axillary nodes normal   Neurologic:   CNII-XII intact, normal strength, sensation and reflexes     throughout   . Assessment:      Diagnosis Orders   1. Annual physical exam  PAP SMEAR   2. Type 2 diabetes mellitus without complication, without long-term current use of insulin (HCC)  Hemoglobin H6E    Basic Metabolic Panel    Hepatic Function Panel        Plan:       All questions answered  Await Pap smear results.     Reviewed recent blood work discussed result answer all questions and concerns  Recheck blood work in 3 months  Follow-up in 10

## 2022-02-28 RX ORDER — POTASSIUM CHLORIDE 600 MG/1
CAPSULE, EXTENDED RELEASE ORAL
Qty: 180 CAPSULE | Refills: 0 | Status: SHIPPED | OUTPATIENT
Start: 2022-02-28 | End: 2022-05-26

## 2022-03-14 RX ORDER — AMLODIPINE BESYLATE 10 MG/1
TABLET ORAL
Qty: 90 TABLET | Refills: 0 | Status: SHIPPED | OUTPATIENT
Start: 2022-03-14 | End: 2022-06-17

## 2022-03-14 NOTE — TELEPHONE ENCOUNTER
Medication:   Requested Prescriptions     Pending Prescriptions Disp Refills    amLODIPine (NORVASC) 10 MG tablet [Pharmacy Med Name: AMLODIPINE BESYLATE 10 MG TAB] 90 tablet 0     Sig: TAKE 1 TABLET BY MOUTH EVERY DAY     Last Filled: 12.16.21  Last appt: 2/1/2022   Next appt: Visit date not found    Last OARRS: No flowsheet data found.

## 2022-03-23 RX ORDER — METFORMIN HYDROCHLORIDE 500 MG/1
500 TABLET, EXTENDED RELEASE ORAL
Qty: 90 TABLET | Refills: 1 | Status: SHIPPED | OUTPATIENT
Start: 2022-03-23 | End: 2022-09-12

## 2022-03-23 NOTE — TELEPHONE ENCOUNTER
Medication:   Requested Prescriptions     Pending Prescriptions Disp Refills    metFORMIN (GLUCOPHAGE-XR) 500 MG extended release tablet 30 tablet 2     Sig: Take 1 tablet by mouth daily (with breakfast)     Last Filled:  2/1/22    Last appt: 2/1/2022   Next appt: Visit date not found    Last Labs DM:   Lab Results   Component Value Date    LABA1C 6.1 01/28/2022

## 2022-03-28 RX ORDER — HYDROCHLOROTHIAZIDE 25 MG/1
TABLET ORAL
Qty: 90 TABLET | Refills: 0 | Status: SHIPPED | OUTPATIENT
Start: 2022-03-28 | End: 2022-06-23

## 2022-03-28 RX ORDER — ATORVASTATIN CALCIUM 10 MG/1
TABLET, FILM COATED ORAL
Qty: 90 TABLET | Refills: 0 | Status: SHIPPED | OUTPATIENT
Start: 2022-03-28 | End: 2022-06-24

## 2022-05-26 RX ORDER — POTASSIUM CHLORIDE 600 MG/1
CAPSULE, EXTENDED RELEASE ORAL
Qty: 180 CAPSULE | Refills: 0 | Status: SHIPPED | OUTPATIENT
Start: 2022-05-26 | End: 2022-08-22

## 2022-05-26 NOTE — TELEPHONE ENCOUNTER
Medication:   Requested Prescriptions     Pending Prescriptions Disp Refills    Potassium Chloride ER 8 [address] [Pharmacy Med Name: POTASSIUM CL ER 8 MEQ CAPSULE] 180 capsule 0     Sig: TAKE 1 CAPSULE BY MOUTH TWICE A DAY        Last Filled:      Patient Phone Number: [phone] (home)     Last appt: 2/1/2022   Next appt: Visit date not found    Last OARRS: No flowsheet data found. Preferred Pharmacy:   [facility], [address] [facility]  [address]   [place] [id number]  Phone: [phone] Fax: [phone]

## 2022-06-13 DIAGNOSIS — E11.9 TYPE 2 DIABETES MELLITUS WITHOUT COMPLICATION, WITHOUT LONG-TERM CURRENT USE OF INSULIN (HCC): Primary | ICD-10-CM

## 2022-06-16 NOTE — TELEPHONE ENCOUNTER
Medication:   Requested Prescriptions     Pending Prescriptions Disp Refills    amLODIPine (NORVASC) 10 MG tablet [Pharmacy Med Name: AMLODIPINE BESYLATE 10 MG TAB] 90 tablet 0     Sig: TAKE 1 TABLET BY MOUTH EVERY DAY       Last Filled:      Patient Phone Number: [phone] (home)     Last appt: 2/1/2022   Next appt: Visit date not found    Last BMP:   Lab Results   Component Value Date     01/28/2022    K 3.8 01/28/2022     01/28/2022    CO2 23 01/28/2022    ANIONGAP 14 01/28/2022    GLUCOSE 113 01/28/2022    BUN 11 01/28/2022    CREATININE 0.5 01/28/2022    LABGLOM >60 01/28/2022    GFRAA >60 01/28/2022    CALCIUM 9.3 01/28/2022      Last CMP:   Lab Results   Component Value Date     01/28/2022    K 3.8 01/28/2022     01/28/2022    CO2 23 01/28/2022    ANIONGAP 14 01/28/2022    GLUCOSE 113 01/28/2022    BUN 11 01/28/2022    CREATININE 0.5 01/28/2022    LABGLOM >60 01/28/2022    GFRAA >60 01/28/2022    PROT 8.0 04/08/2022    LABALBU 4.8 04/08/2022    AGRATIO 1.3 06/20/2018    BILITOT 0.9 04/08/2022    ALKPHOS 74 04/08/2022    ALT 85 04/08/2022    AST 86 04/08/2022    GLOB 3.7 06/20/2018     Last Renal Function:   Lab Results   Component Value Date     01/28/2022    K 3.8 01/28/2022     01/28/2022    CO2 23 01/28/2022    GLUCOSE 113 01/28/2022    BUN 11 01/28/2022    CREATININE 0.5 01/28/2022    LABALBU 4.8 04/08/2022    CALCIUM 9.3 01/28/2022    GFRAA >60 01/28/2022       Last OARRS: No flowsheet data found. Preferred Pharmacy:   [facility], [address] [facility]  [address] [place] [address]  Phone: [phone] Fax: [phone]

## 2022-06-17 RX ORDER — AMLODIPINE BESYLATE 10 MG/1
TABLET ORAL
Qty: 90 TABLET | Refills: 0 | Status: SHIPPED | OUTPATIENT
Start: 2022-06-17 | End: 2022-09-12

## 2022-06-23 RX ORDER — HYDROCHLOROTHIAZIDE 25 MG/1
TABLET ORAL
Qty: 90 TABLET | Refills: 0 | Status: SHIPPED | OUTPATIENT
Start: 2022-06-23 | End: 2022-09-12

## 2022-06-23 NOTE — TELEPHONE ENCOUNTER
Medication:   Requested Prescriptions     Pending Prescriptions Disp Refills    hydroCHLOROthiazide (HYDRODIURIL) 25 MG tablet [Pharmacy Med Name: HYDROCHLOROTHIAZIDE 25 MG TAB] 90 tablet 0     Sig: TAKE 1 TABLET BY MOUTH EVERY DAY     Last Filled:  3/28/22  Last appt: 2/1/2022   Next appt: Due around 8/1/22

## 2022-06-24 RX ORDER — ATORVASTATIN CALCIUM 10 MG/1
TABLET, FILM COATED ORAL
Qty: 90 TABLET | Refills: 0 | Status: SHIPPED | OUTPATIENT
Start: 2022-06-24 | End: 2022-09-12

## 2022-06-24 NOTE — TELEPHONE ENCOUNTER
Medication:   Requested Prescriptions     Pending Prescriptions Disp Refills    atorvastatin (LIPITOR) 10 MG tablet [Pharmacy Med Name: ATORVASTATIN 10 MG TABLET] 90 tablet 0     Sig: TAKE 1 TABLET BY MOUTH EVERY DAY     Last Filled:  3/28/22    Last appt: 2/1/2022   Next appt: Visit date not found    Last Lipid:   Lab Results   Component Value Date    CHOL 135 01/28/2022    TRIG 69 01/28/2022    HDL 62 01/28/2022    LDLCALC 59 01/28/2022

## 2022-06-29 DIAGNOSIS — E11.9 TYPE 2 DIABETES MELLITUS WITHOUT COMPLICATION, WITHOUT LONG-TERM CURRENT USE OF INSULIN (HCC): ICD-10-CM

## 2022-06-29 LAB
ALBUMIN SERPL-MCNC: 4.5 G/DL (ref 3.4–5)
ALP BLD-CCNC: 72 U/L (ref 40–129)
ALT SERPL-CCNC: 22 U/L (ref 10–40)
ANION GAP SERPL CALCULATED.3IONS-SCNC: 14 MMOL/L (ref 3–16)
AST SERPL-CCNC: 25 U/L (ref 15–37)
BILIRUB SERPL-MCNC: 0.8 MG/DL (ref 0–1)
BILIRUBIN DIRECT: <0.2 MG/DL (ref 0–0.3)
BILIRUBIN, INDIRECT: NORMAL MG/DL (ref 0–1)
BUN BLDV-MCNC: 9 MG/DL (ref 7–20)
CALCIUM SERPL-MCNC: 10 MG/DL (ref 8.3–10.6)
CHLORIDE BLD-SCNC: 105 MMOL/L (ref 99–110)
CHOLESTEROL, TOTAL: 152 MG/DL (ref 0–199)
CO2: 25 MMOL/L (ref 21–32)
CREAT SERPL-MCNC: 0.7 MG/DL (ref 0.6–1.2)
FOLATE: >20 NG/ML (ref 4.78–24.2)
GFR AFRICAN AMERICAN: >60
GFR NON-AFRICAN AMERICAN: >60
GLUCOSE BLD-MCNC: 126 MG/DL (ref 70–99)
HCT VFR BLD CALC: 40.8 % (ref 36–48)
HDLC SERPL-MCNC: 68 MG/DL (ref 40–60)
HEMOGLOBIN: 13.8 G/DL (ref 12–16)
LDL CHOLESTEROL CALCULATED: 69 MG/DL
MCH RBC QN AUTO: 32.2 PG (ref 26–34)
MCHC RBC AUTO-ENTMCNC: 33.8 G/DL (ref 31–36)
MCV RBC AUTO: 95.1 FL (ref 80–100)
PDW BLD-RTO: 12.6 % (ref 12.4–15.4)
PLATELET # BLD: 204 K/UL (ref 135–450)
PMV BLD AUTO: 9.2 FL (ref 5–10.5)
POTASSIUM SERPL-SCNC: 4 MMOL/L (ref 3.5–5.1)
RBC # BLD: 4.29 M/UL (ref 4–5.2)
SODIUM BLD-SCNC: 144 MMOL/L (ref 136–145)
TOTAL PROTEIN: 7.8 G/DL (ref 6.4–8.2)
TRIGL SERPL-MCNC: 73 MG/DL (ref 0–150)
TSH SERPL DL<=0.05 MIU/L-ACNC: 1.77 UIU/ML (ref 0.27–4.2)
VITAMIN B-12: 922 PG/ML (ref 211–911)
VLDLC SERPL CALC-MCNC: 15 MG/DL
WBC # BLD: 5.3 K/UL (ref 4–11)

## 2022-06-30 ENCOUNTER — PATIENT MESSAGE (OUTPATIENT)
Dept: PRIMARY CARE CLINIC | Age: 62
End: 2022-06-30

## 2022-06-30 LAB
ESTIMATED AVERAGE GLUCOSE: 131.2 MG/DL
HBA1C MFR BLD: 6.2 %

## 2022-06-30 NOTE — TELEPHONE ENCOUNTER
From: Dagmar SILVA  To: Diana Salinas  Sent: 6/30/2022 4:59 PM EDT  Subject: Lab Results    [address]  [facility] [id number]    Dr Junito Arellano has reviewed your recent lab results, see below Dr Alexandrea Mayo findings.      Blood work was normal.     Be Well

## 2022-08-15 ENCOUNTER — OFFICE VISIT (OUTPATIENT)
Dept: PRIMARY CARE CLINIC | Age: 62
End: 2022-08-15
Payer: COMMERCIAL

## 2022-08-15 VITALS
HEART RATE: 88 BPM | HEIGHT: 66 IN | WEIGHT: 172.2 LBS | BODY MASS INDEX: 27.68 KG/M2 | TEMPERATURE: 97.2 F | RESPIRATION RATE: 16 BRPM | DIASTOLIC BLOOD PRESSURE: 78 MMHG | SYSTOLIC BLOOD PRESSURE: 124 MMHG | OXYGEN SATURATION: 98 %

## 2022-08-15 DIAGNOSIS — E78.2 MIXED HYPERLIPIDEMIA: ICD-10-CM

## 2022-08-15 DIAGNOSIS — M25.541 ARTHRALGIA OF BOTH HANDS: Primary | ICD-10-CM

## 2022-08-15 DIAGNOSIS — E11.9 TYPE 2 DIABETES MELLITUS WITHOUT COMPLICATION, WITHOUT LONG-TERM CURRENT USE OF INSULIN (HCC): ICD-10-CM

## 2022-08-15 DIAGNOSIS — I10 HYPERTENSION, BENIGN: ICD-10-CM

## 2022-08-15 DIAGNOSIS — M25.541 ARTHRALGIA OF BOTH HANDS: ICD-10-CM

## 2022-08-15 DIAGNOSIS — L50.8 ACUTE URTICARIA: ICD-10-CM

## 2022-08-15 DIAGNOSIS — M25.542 ARTHRALGIA OF BOTH HANDS: Primary | ICD-10-CM

## 2022-08-15 DIAGNOSIS — M25.542 ARTHRALGIA OF BOTH HANDS: ICD-10-CM

## 2022-08-15 LAB
ALBUMIN SERPL-MCNC: 4.5 G/DL (ref 3.4–5)
ALP BLD-CCNC: 78 U/L (ref 40–129)
ALT SERPL-CCNC: 13 U/L (ref 10–40)
ANION GAP SERPL CALCULATED.3IONS-SCNC: 11 MMOL/L (ref 3–16)
AST SERPL-CCNC: 19 U/L (ref 15–37)
BILIRUB SERPL-MCNC: 0.6 MG/DL (ref 0–1)
BILIRUBIN DIRECT: <0.2 MG/DL (ref 0–0.3)
BILIRUBIN, INDIRECT: NORMAL MG/DL (ref 0–1)
BUN BLDV-MCNC: 10 MG/DL (ref 7–20)
C-REACTIVE PROTEIN: <3 MG/L (ref 0–5.1)
CALCIUM SERPL-MCNC: 10.1 MG/DL (ref 8.3–10.6)
CHLORIDE BLD-SCNC: 106 MMOL/L (ref 99–110)
CHOLESTEROL, TOTAL: 170 MG/DL (ref 0–199)
CO2: 25 MMOL/L (ref 21–32)
CREAT SERPL-MCNC: 0.6 MG/DL (ref 0.6–1.2)
GFR AFRICAN AMERICAN: >60
GFR NON-AFRICAN AMERICAN: >60
GLUCOSE BLD-MCNC: 109 MG/DL (ref 70–99)
HDLC SERPL-MCNC: 76 MG/DL (ref 40–60)
LDL CHOLESTEROL CALCULATED: 79 MG/DL
POTASSIUM SERPL-SCNC: 3.9 MMOL/L (ref 3.5–5.1)
RHEUMATOID FACTOR: <10 IU/ML
SEDIMENTATION RATE, ERYTHROCYTE: 31 MM/HR (ref 0–30)
SODIUM BLD-SCNC: 142 MMOL/L (ref 136–145)
TOTAL CK: 132 U/L (ref 26–192)
TOTAL PROTEIN: 7.7 G/DL (ref 6.4–8.2)
TRIGL SERPL-MCNC: 77 MG/DL (ref 0–150)
TSH SERPL DL<=0.05 MIU/L-ACNC: 1.8 UIU/ML (ref 0.27–4.2)
VLDLC SERPL CALC-MCNC: 15 MG/DL

## 2022-08-15 PROCEDURE — 3044F HG A1C LEVEL LT 7.0%: CPT | Performed by: FAMILY MEDICINE

## 2022-08-15 PROCEDURE — 99214 OFFICE O/P EST MOD 30 MIN: CPT | Performed by: FAMILY MEDICINE

## 2022-08-15 RX ORDER — ELECTROLYTES/DEXTROSE
1 SOLUTION, ORAL ORAL DAILY
COMMUNITY

## 2022-08-15 SDOH — ECONOMIC STABILITY: FOOD INSECURITY: WITHIN THE PAST 12 MONTHS, THE FOOD YOU BOUGHT JUST DIDN'T LAST AND YOU DIDN'T HAVE MONEY TO GET MORE.: NEVER TRUE

## 2022-08-15 SDOH — ECONOMIC STABILITY: FOOD INSECURITY: WITHIN THE PAST 12 MONTHS, YOU WORRIED THAT YOUR FOOD WOULD RUN OUT BEFORE YOU GOT MONEY TO BUY MORE.: NEVER TRUE

## 2022-08-15 ASSESSMENT — PATIENT HEALTH QUESTIONNAIRE - PHQ9
9. THOUGHTS THAT YOU WOULD BE BETTER OFF DEAD, OR OF HURTING YOURSELF: 0
2. FEELING DOWN, DEPRESSED OR HOPELESS: 0
8. MOVING OR SPEAKING SO SLOWLY THAT OTHER PEOPLE COULD HAVE NOTICED. OR THE OPPOSITE, BEING SO FIGETY OR RESTLESS THAT YOU HAVE BEEN MOVING AROUND A LOT MORE THAN USUAL: 0
4. FEELING TIRED OR HAVING LITTLE ENERGY: 0
5. POOR APPETITE OR OVEREATING: 0
SUM OF ALL RESPONSES TO PHQ QUESTIONS 1-9: 0
6. FEELING BAD ABOUT YOURSELF - OR THAT YOU ARE A FAILURE OR HAVE LET YOURSELF OR YOUR FAMILY DOWN: 0
SUM OF ALL RESPONSES TO PHQ QUESTIONS 1-9: 0
7. TROUBLE CONCENTRATING ON THINGS, SUCH AS READING THE NEWSPAPER OR WATCHING TELEVISION: 0
1. LITTLE INTEREST OR PLEASURE IN DOING THINGS: 0
SUM OF ALL RESPONSES TO PHQ9 QUESTIONS 1 & 2: 0
SUM OF ALL RESPONSES TO PHQ QUESTIONS 1-9: 0
SUM OF ALL RESPONSES TO PHQ QUESTIONS 1-9: 0
10. IF YOU CHECKED OFF ANY PROBLEMS, HOW DIFFICULT HAVE THESE PROBLEMS MADE IT FOR YOU TO DO YOUR WORK, TAKE CARE OF THINGS AT HOME, OR GET ALONG WITH OTHER PEOPLE: 0
3. TROUBLE FALLING OR STAYING ASLEEP: 0

## 2022-08-15 ASSESSMENT — ENCOUNTER SYMPTOMS
RESPIRATORY NEGATIVE: 1
EYES NEGATIVE: 1
GASTROINTESTINAL NEGATIVE: 1

## 2022-08-15 ASSESSMENT — SOCIAL DETERMINANTS OF HEALTH (SDOH): HOW HARD IS IT FOR YOU TO PAY FOR THE VERY BASICS LIKE FOOD, HOUSING, MEDICAL CARE, AND HEATING?: NOT HARD AT ALL

## 2022-08-15 NOTE — PROGRESS NOTES
SUBJECTIVE:  Patient ID: Bronwyn Bautista is a 60 y.o. y.o. female     HPI   Pt is here with concern about joint ache pain and stiffness james in the AM, no direct trauma or injury  Patient used to be a heavy alcohol consumption option and she quit completely in  Also she is struggling with itchy skin treated tried antihistamine helps some she has episodes of it  She is also concerned about tingling numbness in her hands occasionally  Patient with type 2 diabetes on medication, she did gain weight since the last time she feels after she quit drinking alcohol she has been eating more specially sweets  She is involved with support group online  She is doing well and not drinking  Now she needs to focus more on her weight  Patient with hypertension stable on current medication  Patient hyperlipidemia we will repeat fasting blood work today  Past Medical History:   Diagnosis Date    Hypertension       Past Surgical History:   Procedure Laterality Date    CT NEEDLE BIOPSY LIVER PERCUTANEOUS  11/9/2021    CT NEEDLE BIOPSY LIVER PERCUTANEOUS 11/9/2021 [facility] CT SCAN     Family History   Problem Relation Age of Onset    Diabetes Mother     Stroke Mother     High Blood Pressure Mother     Diabetes Father     High Blood Pressure Father     Cancer Father         Brain tumor @age 50     High Blood Pressure Brother     High Cholesterol Brother     Other Brother         Neurosarcoidosis /Brain stem    Breast Cancer Paternal Cousin      Social History     Socioeconomic History    Marital status:      Spouse name: Abhishek Rangel     Number of children: 1   Occupational History    Occupation: retired      Comment: office work   Tobacco Use    Smoking status: Never    Smokeless tobacco: Never   Substance and Sexual Activity    Alcohol use:  Yes     Alcohol/week: 1.0 standard drink     Types: 1 Glasses of wine per week    Drug use: No    Sexual activity: Yes     Partners: Male     Comment: M daughter 28 y o     Social Determinants of motion and neck supple. Lymphadenopathy:      Cervical: No cervical adenopathy. Skin:     General: Skin is warm and dry. Coloration: Skin is not pale. Findings: No erythema or rash. Comments: microfilaments test exam was negative  No rash   Neurological:      Mental Status: She is alert. ASSESSMENT:   Diagnosis Orders   1. Arthralgia of both hands  Sedimentation Rate    Rheumatoid Factor    CK    C-Reactive Protein    NICK profile    Hepatic Function Panel    VITAMIN B6      2. Type 2 diabetes mellitus without complication, without long-term current use of insulin ([facility])  Hemoglobin X5M    Basic Metabolic Panel    Lipid Panel    TSH      3. Mixed hyperlipidemia        4. Hypertension, benign        5. Acute urticaria              PLAN:  See orders  Discussed healthy lifestyle patient with concern about rheumatoid arthritis and lupus she has a cousin who has it.   Discussed skin care

## 2022-08-16 LAB
ANTI-NUCLEAR ANTIBODY (ANA): NEGATIVE
ESTIMATED AVERAGE GLUCOSE: 128.4 MG/DL
HBA1C MFR BLD: 6.1 %

## 2022-08-18 LAB — VITAMIN B6: 58.1 NMOL/L (ref 20–125)

## 2022-08-19 DIAGNOSIS — M25.542 ARTHRALGIA OF BOTH HANDS: Primary | ICD-10-CM

## 2022-08-19 DIAGNOSIS — M25.541 ARTHRALGIA OF BOTH HANDS: Primary | ICD-10-CM

## 2022-08-22 RX ORDER — POTASSIUM CHLORIDE 600 MG/1
CAPSULE, EXTENDED RELEASE ORAL
Qty: 180 CAPSULE | Refills: 0 | Status: SHIPPED | OUTPATIENT
Start: 2022-08-22

## 2022-08-22 NOTE — TELEPHONE ENCOUNTER
Medication:   Requested Prescriptions     Pending Prescriptions Disp Refills    Potassium Chloride ER 8 MEQ CPCR [Pharmacy Med Name: POTASSIUM CL ER 8 MEQ CAPSULE] 180 capsule 0     Sig: TAKE 1 CAPSULE BY MOUTH TWICE A DAY     Last Filled:  5/26/22    Last appt: 8/15/2022   Next appt: Visit date not found

## 2022-08-24 ENCOUNTER — PROCEDURE VISIT (OUTPATIENT)
Dept: NEUROLOGY | Age: 62
End: 2022-08-24
Payer: COMMERCIAL

## 2022-08-24 DIAGNOSIS — G56.03 BILATERAL CARPAL TUNNEL SYNDROME: Primary | ICD-10-CM

## 2022-08-24 DIAGNOSIS — G56.23 ENTRAPMENT OF BOTH ULNAR NERVES AT ELBOW: ICD-10-CM

## 2022-08-24 PROCEDURE — 95886 MUSC TEST DONE W/N TEST COMP: CPT | Performed by: PSYCHIATRY & NEUROLOGY

## 2022-08-24 PROCEDURE — 95911 NRV CNDJ TEST 9-10 STUDIES: CPT | Performed by: PSYCHIATRY & NEUROLOGY

## 2022-08-24 NOTE — PATIENT INSTRUCTIONS
Verbal consent was obtained from patient and/or patient's advocate for in office procedure with Dr. Dyana Matson (EMG or EEG).

## 2022-08-24 NOTE — PROGRESS NOTES
Caralee Spurling, M.D. [facility]) Physicians/[facility]  Board Certified in [address], [address] [place] [address]    EMG / NERVE CONDUCTION STUDY      PATIENT:  Markos Rangel       DATE OF EM22     YOB: 1960       REASON FOR EMG:   Bilateral arm pain and numbness      REFERRING PHYSICIAN:  Anna Bills MD  [address],  [place]     SUMMARY:   Bilateral median motor and sensory nerve studies with prolonged distal latencies. Bilateral ulnar motor nerve studies with slowing of conduction velocities across the elbow. Bilateral ulnar sensory nerve studies were normal.  The left radial sensory nerve study was normal.  Needle EMG of several muscles in both upper extremities was normal.      CLINICAL DIAGNOSIS:  Carpal tunnel syndrome        EMG RESULTS:     1. This patient has moderately severe bilateral median nerve lesions at the wrist.  (Carpal tunnel syndrome). The left side is slightly more involved when compared to the right side. 2.  This patient also has moderately severe bilateral ulnar nerve lesions at the elbow. Again, the left side is slightly more involved when compared to the right side. ---------------------------------------------  Caralee Spurling, M.D.   Electromyographer / Neurologist

## 2022-08-30 DIAGNOSIS — M25.541 ARTHRALGIA OF BOTH HANDS: Primary | ICD-10-CM

## 2022-08-30 DIAGNOSIS — M25.542 ARTHRALGIA OF BOTH HANDS: Primary | ICD-10-CM

## 2022-08-30 DIAGNOSIS — R94.131 ABNORMAL EMG: ICD-10-CM

## 2022-09-01 DIAGNOSIS — M25.542 ARTHRALGIA OF BOTH HANDS: Primary | ICD-10-CM

## 2022-09-01 DIAGNOSIS — M25.541 ARTHRALGIA OF BOTH HANDS: Primary | ICD-10-CM

## 2022-09-01 DIAGNOSIS — R94.131 ABNORMAL EMG: ICD-10-CM

## 2022-09-09 ENCOUNTER — OFFICE VISIT (OUTPATIENT)
Dept: PRIMARY CARE CLINIC | Age: 62
End: 2022-09-09
Payer: COMMERCIAL

## 2022-09-09 VITALS
TEMPERATURE: 97.4 F | HEART RATE: 97 BPM | DIASTOLIC BLOOD PRESSURE: 74 MMHG | RESPIRATION RATE: 16 BRPM | BODY MASS INDEX: 28.57 KG/M2 | SYSTOLIC BLOOD PRESSURE: 110 MMHG | OXYGEN SATURATION: 98 % | HEIGHT: 66 IN | WEIGHT: 177.8 LBS

## 2022-09-09 DIAGNOSIS — M25.542 ARTHRALGIA OF BOTH HANDS: Primary | ICD-10-CM

## 2022-09-09 DIAGNOSIS — M25.541 ARTHRALGIA OF BOTH HANDS: Primary | ICD-10-CM

## 2022-09-09 DIAGNOSIS — L50.8 ACUTE URTICARIA: ICD-10-CM

## 2022-09-09 DIAGNOSIS — F41.9 ANXIETY: ICD-10-CM

## 2022-09-09 PROCEDURE — 99214 OFFICE O/P EST MOD 30 MIN: CPT | Performed by: FAMILY MEDICINE

## 2022-09-09 RX ORDER — CELECOXIB 200 MG/1
200 CAPSULE ORAL DAILY
Qty: 30 CAPSULE | Refills: 1 | Status: SHIPPED | OUTPATIENT
Start: 2022-09-09

## 2022-09-09 RX ORDER — HYDROXYZINE HYDROCHLORIDE 25 MG/1
25 TABLET, FILM COATED ORAL EVERY 6 HOURS PRN
Qty: 30 TABLET | Refills: 0 | Status: SHIPPED | OUTPATIENT
Start: 2022-09-09 | End: 2022-10-09

## 2022-09-09 ASSESSMENT — ENCOUNTER SYMPTOMS
GASTROINTESTINAL NEGATIVE: 1
RESPIRATORY NEGATIVE: 1
EYES NEGATIVE: 1

## 2022-09-09 NOTE — TELEPHONE ENCOUNTER
Medication:   Requested Prescriptions     Pending Prescriptions Disp Refills    metFORMIN (GLUCOPHAGE-XR) 500 MG extended release tablet [Pharmacy Med Name: METFORMIN HCL  MG TABLET] 90 tablet 1     Sig: TAKE 1 TABLET BY MOUTH EVERY DAY WITH BREAKFAST    hydroCHLOROthiazide (HYDRODIURIL) 25 MG tablet [Pharmacy Med Name: HYDROCHLOROTHIAZIDE 25 MG TAB] 90 tablet 0     Sig: TAKE 1 TABLET BY MOUTH EVERY DAY     Last Filled:  3.23.22 6.23.22    Last appt: 8/15/2022   Next appt:     Last Labs DM:   Lab Results   Component Value Date/Time    LABA1C 6.1 08/15/2022 12:31 PM

## 2022-09-09 NOTE — TELEPHONE ENCOUNTER
Medication:   Requested Prescriptions     Pending Prescriptions Disp Refills    atorvastatin (LIPITOR) 10 MG tablet [Pharmacy Med Name: ATORVASTATIN 10 MG TABLET] 90 tablet 0     Sig: TAKE 1 TABLET BY MOUTH EVERY DAY     Last Filled:  6.24.22    Last appt: 8/15/2022   Next appt: Visit date not found    Last Lipid:   Lab Results   Component Value Date/Time    CHOL 170 08/15/2022 12:31 PM    TRIG 77 08/15/2022 12:31 PM    HDL 76 08/15/2022 12:31 PM    [address] 08/15/2022 12:31 PM

## 2022-09-09 NOTE — PROGRESS NOTES
SUBJECTIVE:  Patient ID: Chloe Deras is a 60 y.o. y.o. female     HPI   Patient presents today with a concern about wrist hand pain she had EMG was Abnormal so positive for carpal tunnel syndrome and also possible ulnar damage at the elbow  She already had an appointment to see Ortho October 12 she would like to be seen sooner she wants to see if another referral for different group  Her neighbor her son is hand surgeon she may reach out to them  She is also been having some of the itching which is no rash but just feels. She is feeling anxious nervous worry especially with the pain she is having should not. Exercise as she used to before she gained weight  Past Medical History:   Diagnosis Date    Hypertension       Past Surgical History:   Procedure Laterality Date    CT NEEDLE BIOPSY LIVER PERCUTANEOUS  11/9/2021    CT NEEDLE BIOPSY LIVER PERCUTANEOUS 11/9/2021 [facility] CT SCAN     Family History   Problem Relation Age of Onset    Diabetes Mother     Stroke Mother     High Blood Pressure Mother     Diabetes Father     High Blood Pressure Father     Cancer Father         Brain tumor @age 50     High Blood Pressure Brother     High Cholesterol Brother     Other Brother         Neurosarcoidosis /Brain stem    Breast Cancer Paternal Cousin      Social History     Socioeconomic History    Marital status:      Spouse name: Mark Martins     Number of children: 1    Years of education: None    Highest education level: None   Occupational History    Occupation: retired      Comment: office work   Tobacco Use    Smoking status: Never    Smokeless tobacco: Never   Substance and Sexual Activity    Alcohol use:  Yes     Alcohol/week: 1.0 standard drink     Types: 1 Glasses of wine per week    Drug use: No    Sexual activity: Yes     Partners: Male     Comment: M daughter 28 y o     Social Determinants of Health     Financial Resource Strain: Low Risk     Difficulty of Paying Living Expenses: Not hard at all   Food Insecurity: No Food Insecurity    Worried About Running Out of Food in the Last Year: Never true    Ran Out of Food in the Last Year: Never true     Current Outpatient Medications   Medication Sig Dispense Refill    Potassium Chloride ER 8 MEQ CPCR TAKE 1 CAPSULE BY MOUTH TWICE A  capsule 0    Multiple Vitamin (MULTIVITAMIN ADULT) TABS Take 1 tablet by mouth daily      atorvastatin (LIPITOR) 10 MG tablet TAKE 1 TABLET BY MOUTH EVERY DAY 90 tablet 0    hydroCHLOROthiazide (HYDRODIURIL) 25 MG tablet TAKE 1 TABLET BY MOUTH EVERY DAY 90 tablet 0    amLODIPine (NORVASC) 10 MG tablet TAKE 1 TABLET BY MOUTH EVERY DAY 90 tablet 0    metFORMIN (GLUCOPHAGE-XR) 500 MG extended release tablet Take 1 tablet by mouth daily (with breakfast) 90 tablet 1    albuterol sulfate HFA (VENTOLIN HFA) 108 (90 Base) MCG/ACT inhaler Inhale 2 puffs into the lungs 4 times daily as needed for Wheezing or Shortness of Breath (cough) 54 g 0    Probiotic Product (PROBIOTIC DAILY PO) Take by mouth      ONE TOUCH LANCETS MISC 1 each by Does not apply route daily 100 each 3    Blood Glucose Monitoring Suppl (ONE TOUCH BASIC SYSTEM) W/DEVICE KIT 1 kit by Does not apply route daily as needed (does not apply) 1 kit 0    glucose blood VI test strips (ASCENSIA AUTODISC VI;ONE TOUCH ULTRA TEST VI) strip 1 each by In Vitro route daily As needed. BRAND ONE TOUCH 100 each 3    EPINEPHrine (EPIPEN 2-CINTHYA) 0.3 MG/0.3ML SOAJ injection Inject 0.3 mLs into the skin once for 1 dose Use as directed for allergic reaction 0.3 mL 1    aspirin 81 MG tablet Take 81 mg by mouth daily (Patient not taking: Reported on 9/9/2022)      BD PEN NEEDLE IRMA U/F 32G X 4 MM MISC USE WITH INSULIN  Package 1     No current facility-administered medications for this visit. No Known Allergies    Review of Systems   Constitutional: Negative. HENT: Negative. Eyes: Negative. Respiratory: Negative. Cardiovascular: Negative. Gastrointestinal: Negative. Musculoskeletal:  Positive for arthralgias. Skin:  Negative for rash. Psychiatric/Behavioral:  Positive for agitation. The patient is nervous/anxious. All other systems reviewed and are negative. OBJECTIVE:  /74 (Site: Right Upper Arm, Position: Sitting, Cuff Size: Medium Adult)   Pulse 97   Temp 97.4 °F (36.3 °C) (Infrared)   Resp 16   Ht 5' 6\" (1.676 m)   Wt 177 lb 12.8 oz (80.6 kg)   SpO2 98%   BMI 28.70 kg/m²     Physical Exam  Vitals reviewed. Eyes:      General: No scleral icterus. Conjunctiva/sclera: Conjunctivae normal.   Neck:      Thyroid: No thyromegaly. Vascular: No JVD. Cardiovascular:      Rate and Rhythm: Normal rate and regular rhythm. Heart sounds: Normal heart sounds. No murmur heard. No friction rub. No gallop. Pulmonary:      Effort: Pulmonary effort is normal.      Breath sounds: Normal breath sounds. No wheezing or rales. Abdominal:      General: Bowel sounds are normal. There is no distension. Palpations: Abdomen is soft. There is no mass. Tenderness: There is no abdominal tenderness. Hernia: No hernia is present. Musculoskeletal:         General: Tenderness present. Left wrist: Tenderness present. Comments: Left wrist in a brace   Lymphadenopathy:      Cervical: No cervical adenopathy. Skin:     Findings: No rash. Neurological:      Mental Status: She is alert and oriented to person, place, and time. No skin rash or lesions    ASSESSMENT:   Diagnosis Orders   1. Arthralgia of both hands  External Referral To Orthopedic Surgery    celecoxib (CELEBREX) 200 MG capsule      2. Acute urticaria  hydrOXYzine HCl (ATARAX) 25 MG tablet      3.  Anxiety  hydrOXYzine HCl (ATARAX) 25 MG tablet              PLAN:    See orders

## 2022-09-12 ENCOUNTER — TELEPHONE (OUTPATIENT)
Dept: ADMINISTRATIVE | Age: 62
End: 2022-09-12

## 2022-09-12 RX ORDER — HYDROCHLOROTHIAZIDE 25 MG/1
TABLET ORAL
Qty: 90 TABLET | Refills: 0 | Status: SHIPPED | OUTPATIENT
Start: 2022-09-12

## 2022-09-12 RX ORDER — AMLODIPINE BESYLATE 10 MG/1
TABLET ORAL
Qty: 90 TABLET | Refills: 0 | Status: SHIPPED | OUTPATIENT
Start: 2022-09-12

## 2022-09-12 RX ORDER — METFORMIN HYDROCHLORIDE 500 MG/1
TABLET, EXTENDED RELEASE ORAL
Qty: 90 TABLET | Refills: 1 | Status: SHIPPED | OUTPATIENT
Start: 2022-09-12

## 2022-09-12 RX ORDER — ATORVASTATIN CALCIUM 10 MG/1
TABLET, FILM COATED ORAL
Qty: 90 TABLET | Refills: 0 | Status: SHIPPED | OUTPATIENT
Start: 2022-09-12

## 2022-09-12 NOTE — TELEPHONE ENCOUNTER
PA submitted VIA [facility] for  Celecoxib 200MG capsules Key: ANICETO MAURER Case ID: [id number] - Rx #: [id number]  PENDING    PA has been denied   Celecoxib 200MG capsules

## 2022-11-09 ENCOUNTER — OFFICE VISIT (OUTPATIENT)
Dept: PRIMARY CARE CLINIC | Age: 62
End: 2022-11-09
Payer: COMMERCIAL

## 2022-11-09 VITALS
TEMPERATURE: 98.6 F | DIASTOLIC BLOOD PRESSURE: 76 MMHG | OXYGEN SATURATION: 99 % | WEIGHT: 184 LBS | HEART RATE: 88 BPM | SYSTOLIC BLOOD PRESSURE: 118 MMHG | HEIGHT: 66 IN | BODY MASS INDEX: 29.57 KG/M2 | RESPIRATION RATE: 16 BRPM

## 2022-11-09 DIAGNOSIS — M25.541 ARTHRALGIA OF BOTH HANDS: ICD-10-CM

## 2022-11-09 DIAGNOSIS — E78.2 MIXED HYPERLIPIDEMIA: ICD-10-CM

## 2022-11-09 DIAGNOSIS — F41.9 ANXIETY: ICD-10-CM

## 2022-11-09 DIAGNOSIS — M25.542 ARTHRALGIA OF BOTH HANDS: ICD-10-CM

## 2022-11-09 DIAGNOSIS — E11.9 TYPE 2 DIABETES MELLITUS WITHOUT COMPLICATION, WITHOUT LONG-TERM CURRENT USE OF INSULIN (HCC): ICD-10-CM

## 2022-11-09 DIAGNOSIS — Z01.818 PREOP EXAMINATION: ICD-10-CM

## 2022-11-09 DIAGNOSIS — I10 HYPERTENSION, BENIGN: ICD-10-CM

## 2022-11-09 DIAGNOSIS — Z01.818 PREOP EXAMINATION: Primary | ICD-10-CM

## 2022-11-09 LAB
ANION GAP SERPL CALCULATED.3IONS-SCNC: 13 MMOL/L (ref 3–16)
BUN BLDV-MCNC: 12 MG/DL (ref 7–20)
CALCIUM SERPL-MCNC: 10.1 MG/DL (ref 8.3–10.6)
CHLORIDE BLD-SCNC: 99 MMOL/L (ref 99–110)
CO2: 24 MMOL/L (ref 21–32)
CREAT SERPL-MCNC: 0.7 MG/DL (ref 0.6–1.2)
GFR SERPL CREATININE-BSD FRML MDRD: >60 ML/MIN/{1.73_M2}
GLUCOSE BLD-MCNC: 117 MG/DL (ref 70–99)
HCT VFR BLD CALC: 41.3 % (ref 36–48)
HEMOGLOBIN: 14 G/DL (ref 12–16)
MCH RBC QN AUTO: 30.7 PG (ref 26–34)
MCHC RBC AUTO-ENTMCNC: 33.8 G/DL (ref 31–36)
MCV RBC AUTO: 91 FL (ref 80–100)
PDW BLD-RTO: 12.6 % (ref 12.4–15.4)
PLATELET # BLD: 228 K/UL (ref 135–450)
PMV BLD AUTO: 9 FL (ref 5–10.5)
POTASSIUM SERPL-SCNC: 3.8 MMOL/L (ref 3.5–5.1)
RBC # BLD: 4.54 M/UL (ref 4–5.2)
SODIUM BLD-SCNC: 136 MMOL/L (ref 136–145)
WBC # BLD: 7.4 K/UL (ref 4–11)

## 2022-11-09 PROCEDURE — 99244 OFF/OP CNSLTJ NEW/EST MOD 40: CPT | Performed by: FAMILY MEDICINE

## 2022-11-09 PROCEDURE — 3078F DIAST BP <80 MM HG: CPT | Performed by: FAMILY MEDICINE

## 2022-11-09 PROCEDURE — 3074F SYST BP LT 130 MM HG: CPT | Performed by: FAMILY MEDICINE

## 2022-11-09 PROCEDURE — 93000 ELECTROCARDIOGRAM COMPLETE: CPT | Performed by: FAMILY MEDICINE

## 2022-11-09 RX ORDER — MELOXICAM 15 MG/1
15 TABLET ORAL DAILY
Qty: 30 TABLET | Refills: 1 | Status: SHIPPED | OUTPATIENT
Start: 2022-11-09

## 2022-11-09 ASSESSMENT — ENCOUNTER SYMPTOMS
RESPIRATORY NEGATIVE: 1
GASTROINTESTINAL NEGATIVE: 1
EYES NEGATIVE: 1
ALLERGIC/IMMUNOLOGIC NEGATIVE: 1

## 2022-11-09 NOTE — PROGRESS NOTES
SUBJECTIVE:  Joan Carlson is a 60 y.o. female who presents for evaluation for pre op for left CTS. The pain is described as constant and is 5/10 in intensity. Onset was several months ago. Symptoms have been gradually worsening since. Aggravating factors:certain movements. Alleviating factors: rest. Associated symptoms: pain tingling and numbness . The patient denies chest pain or SOB, no fever or ST, no cough, no N/V/D. Review of Systems   Constitutional: Negative. HENT: Negative. Eyes: Negative. Respiratory: Negative. Cardiovascular: Negative. Gastrointestinal: Negative. Endocrine: Negative. Genitourinary: Negative. Musculoskeletal:  Positive for arthralgias and myalgias. Skin: Negative. Allergic/Immunologic: Negative. Neurological: Negative. Hematological: Negative. Psychiatric/Behavioral: Negative. All other systems reviewed and are negative.    Past Medical History:   Diagnosis Date    Hypertension       Patient Active Problem List    Diagnosis Date Noted    Fatty liver 06/21/2018    Depression 12/30/2015    Type II diabetes mellitus ([facility] [address] 06/24/2013    Hearing loss 06/02/2011    Candidiasis of vulva and vagina 03/18/2010    Hypertension, benign 10/01/2009    Patient overweight 03/19/2009    Menopausal and postmenopausal disorder 03/19/2009    Urge incontinence 03/19/2009    Allergic rhinitis 07/18/2008      Past Surgical History:   Procedure Laterality Date    CT NEEDLE BIOPSY LIVER PERCUTANEOUS  11/9/2021    CT NEEDLE BIOPSY LIVER PERCUTANEOUS 11/9/2021 [facility] CT SCAN     Family History   Problem Relation Age of Onset    Diabetes Mother     Stroke Mother     High Blood Pressure Mother     Diabetes Father     High Blood Pressure Father     Cancer Father         Brain tumor @age 50     High Blood Pressure Brother     High Cholesterol Brother     Other Brother         Neurosarcoidosis /Brain stem    Breast Cancer Paternal Cousin      Social History Socioeconomic History    Marital status:      Spouse name: Marie Mims     Number of children: 1    Years of education: None    Highest education level: None   Occupational History    Occupation: retired      Comment: office work   Tobacco Use    Smoking status: Never    Smokeless tobacco: Never   Substance and Sexual Activity    Alcohol use:  Yes     Alcohol/week: 1.0 standard drink     Types: 1 Glasses of wine per week    Drug use: No    Sexual activity: Yes     Partners: Male     Comment: M daughter 28 y o     Social Determinants of Health     Financial Resource Strain: Low Risk     Difficulty of Paying Living Expenses: Not hard at all   Food Insecurity: No Food Insecurity    Worried About Running Out of Food in the Last Year: Never true    Ran Out of Food in the Last Year: Never true     Current Outpatient Medications   Medication Sig Dispense Refill    amLODIPine (NORVASC) 10 MG tablet TAKE 1 TABLET BY MOUTH EVERY DAY 90 tablet 0    metFORMIN (GLUCOPHAGE-XR) 500 MG extended release tablet TAKE 1 TABLET BY MOUTH EVERY DAY WITH BREAKFAST 90 tablet 1    hydroCHLOROthiazide (HYDRODIURIL) 25 MG tablet TAKE 1 TABLET BY MOUTH EVERY DAY 90 tablet 0    atorvastatin (LIPITOR) 10 MG tablet TAKE 1 TABLET BY MOUTH EVERY DAY 90 tablet 0    Potassium Chloride ER 8 MEQ CPCR TAKE 1 CAPSULE BY MOUTH TWICE A  capsule 0    Multiple Vitamin (MULTIVITAMIN ADULT) TABS Take 1 tablet by mouth daily      albuterol sulfate HFA (VENTOLIN HFA) 108 (90 Base) MCG/ACT inhaler Inhale 2 puffs into the lungs 4 times daily as needed for Wheezing or Shortness of Breath (cough) 54 g 0    Probiotic Product (PROBIOTIC DAILY PO) Take by mouth      ONE TOUCH LANCETS MISC 1 each by Does not apply route daily 100 each 3    Blood Glucose Monitoring Suppl (ONE TOUCH BASIC SYSTEM) W/DEVICE KIT 1 kit by Does not apply route daily as needed (does not apply) 1 kit 0    glucose blood VI test strips (ASCENSIA AUTODISC VI;ONE TOUCH ULTRA TEST VI) strip 1 each by In Vitro route daily As needed. BRAND ONE TOUCH 100 each 3    celecoxib (CELEBREX) 200 MG capsule Take 1 capsule by mouth daily (Patient not taking: Reported on 11/9/2022) 30 capsule 1     No current facility-administered medications for this visit. Current Outpatient Medications on File Prior to Visit   Medication Sig Dispense Refill    amLODIPine (NORVASC) 10 MG tablet TAKE 1 TABLET BY MOUTH EVERY DAY 90 tablet 0    metFORMIN (GLUCOPHAGE-XR) 500 MG extended release tablet TAKE 1 TABLET BY MOUTH EVERY DAY WITH BREAKFAST 90 tablet 1    hydroCHLOROthiazide (HYDRODIURIL) 25 MG tablet TAKE 1 TABLET BY MOUTH EVERY DAY 90 tablet 0    atorvastatin (LIPITOR) 10 MG tablet TAKE 1 TABLET BY MOUTH EVERY DAY 90 tablet 0    Potassium Chloride ER 8 MEQ CPCR TAKE 1 CAPSULE BY MOUTH TWICE A  capsule 0    Multiple Vitamin (MULTIVITAMIN ADULT) TABS Take 1 tablet by mouth daily      albuterol sulfate HFA (VENTOLIN HFA) 108 (90 Base) MCG/ACT inhaler Inhale 2 puffs into the lungs 4 times daily as needed for Wheezing or Shortness of Breath (cough) 54 g 0    Probiotic Product (PROBIOTIC DAILY PO) Take by mouth      ONE TOUCH LANCETS MISC 1 each by Does not apply route daily 100 each 3    Blood Glucose Monitoring Suppl (ONE TOUCH BASIC SYSTEM) W/DEVICE KIT 1 kit by Does not apply route daily as needed (does not apply) 1 kit 0    glucose blood VI test strips (ASCENSIA AUTODISC VI;ONE TOUCH ULTRA TEST VI) strip 1 each by In Vitro route daily As needed. BRAND ONE TOUCH 100 each 3    celecoxib (CELEBREX) 200 MG capsule Take 1 capsule by mouth daily (Patient not taking: Reported on 11/9/2022) 30 capsule 1     No current facility-administered medications on file prior to visit. No Known Allergies    OBJECTIVE:  /76   Pulse 88   Temp 98.6 °F (37 °C)   Resp 16   Ht 5' 6\" (1.676 m)   Wt 184 lb (83.5 kg)   SpO2 99%   BMI 29.70 kg/m²   Physical Exam  Vitals reviewed.    Constitutional:       Appearance: Normal appearance. HENT:      Head: Normocephalic and atraumatic. Eyes:      General: No scleral icterus. Conjunctiva/sclera: Conjunctivae normal.   Neck:      Thyroid: No thyromegaly. Vascular: No JVD. Cardiovascular:      Rate and Rhythm: Normal rate and regular rhythm. Heart sounds: Normal heart sounds. No murmur heard. No friction rub. No gallop. Pulmonary:      Effort: Pulmonary effort is normal.      Breath sounds: Normal breath sounds. No wheezing or rales. Abdominal:      General: Bowel sounds are normal. There is no distension. Palpations: Abdomen is soft. There is no mass. Tenderness: There is no abdominal tenderness. Hernia: No hernia is present. Lymphadenopathy:      Cervical: No cervical adenopathy. Skin:     Findings: No rash. Neurological:      Mental Status: She is alert and oriented to person, place, and time. ASSESSMENT/PLAN:   Diagnosis Orders   1. Preop examination  EKG 12 lead    EKG 12 lead    Basic Metabolic Panel    CBC    Hemoglobin A1C      2. Arthralgia of both hands        3. Type 2 diabetes mellitus without complication, without long-term current use of insulin ([address]        4. Hypertension, benign        5. Mixed hyperlipidemia        6. Anxiety            1. Discussed the risk of surgery including bleeding and infection, and the risks of general anesthetic including MI, CVA, sudden death or even reaction to anesthetic medications. The patient understands the risks, any and all questions were answered to the patient's satisfaction. 2. Okay for the planned surgery       Date of Surgery Update (To be completed by Attending Surgeon day of surgery)  Copy is given to the pt and one is faxed to the surgeon.

## 2022-11-10 LAB
ESTIMATED AVERAGE GLUCOSE: 177.2 MG/DL
HBA1C MFR BLD: 7.8 %

## 2022-11-21 RX ORDER — POTASSIUM CHLORIDE 600 MG/1
CAPSULE, EXTENDED RELEASE ORAL
Qty: 180 CAPSULE | Refills: 0 | Status: SHIPPED | OUTPATIENT
Start: 2022-11-21

## 2022-11-21 NOTE — TELEPHONE ENCOUNTER
Medication:   Requested Prescriptions     Pending Prescriptions Disp Refills    potassium chloride 8 MEQ CPCR [Pharmacy Med Name: POTASSIUM CL ER 8 MEQ CAPSULE] 180 capsule 0     Sig: TAKE 1 CAPSULE BY MOUTH TWICE A DAY     Last Filled:  8.22.22    Last appt: 11/9/2022   Next appt: Visit date not found    Last OARRS: No flowsheet data found.

## 2022-12-08 RX ORDER — HYDROCHLOROTHIAZIDE 25 MG/1
TABLET ORAL
Qty: 90 TABLET | Refills: 0 | Status: SHIPPED | OUTPATIENT
Start: 2022-12-08

## 2022-12-08 NOTE — TELEPHONE ENCOUNTER
Medication:   Requested Prescriptions     Pending Prescriptions Disp Refills    hydroCHLOROthiazide (HYDRODIURIL) 25 MG tablet [Pharmacy Med Name: HYDROCHLOROTHIAZIDE 25 MG TAB] 90 tablet 0     Sig: TAKE 1 TABLET BY MOUTH EVERY DAY       Last Filled:      Patient Phone Number: [phone] (home)     Last appt: 11/9/2022   Next appt: Visit date not found    Last BMP:   Lab Results   Component Value Date/Time     11/09/2022 02:40 PM    K 3.8 11/09/2022 02:40 PM    CL 99 11/09/2022 02:40 PM    CO2 24 11/09/2022 02:40 PM    ANIONGAP 13 11/09/2022 02:40 PM    GLUCOSE 117 11/09/2022 02:40 PM    BUN 12 11/09/2022 02:40 PM    CREATININE 0.7 11/09/2022 02:40 PM    LABGLOM >60 11/09/2022 02:40 PM    GFRAA >60 08/15/2022 12:31 PM    CALCIUM 10.1 11/09/2022 02:40 PM      Last CMP:   Lab Results   Component Value Date/Time     11/09/2022 02:40 PM    K 3.8 11/09/2022 02:40 PM    CL 99 11/09/2022 02:40 PM    CO2 24 11/09/2022 02:40 PM    ANIONGAP 13 11/09/2022 02:40 PM    GLUCOSE 117 11/09/2022 02:40 PM    BUN 12 11/09/2022 02:40 PM    CREATININE 0.7 11/09/2022 02:40 PM    LABGLOM >60 11/09/2022 02:40 PM    GFRAA >60 08/15/2022 12:31 PM    PROT 7.7 08/15/2022 12:31 PM    LABALBU 4.5 08/15/2022 12:31 PM    AGRATIO 1.3 06/20/2018 11:06 AM    BILITOT 0.6 08/15/2022 12:31 PM    ALKPHOS 78 08/15/2022 12:31 PM    ALT 13 08/15/2022 12:31 PM    AST 19 08/15/2022 12:31 PM    GLOB 3.7 06/20/2018 11:06 AM     Last Renal Function:   Lab Results   Component Value Date/Time     11/09/2022 02:40 PM    K 3.8 11/09/2022 02:40 PM    CL 99 11/09/2022 02:40 PM    CO2 24 11/09/2022 02:40 PM    GLUCOSE 117 11/09/2022 02:40 PM    BUN 12 11/09/2022 02:40 PM    CREATININE 0.7 11/09/2022 02:40 PM    LABALBU 4.5 08/15/2022 12:31 PM    CALCIUM 10.1 11/09/2022 02:40 PM    GFRAA >60 08/15/2022 12:31 PM       Last OARRS: No flowsheet data found.     Preferred Pharmacy:   [facility], [address] [facility] [phone] - [address] [facility] [address]  Phone: [phone] Fax: [phone]  Medication:   Requested Prescriptions     Pending Prescriptions Disp Refills    hydroCHLOROthiazide (HYDRODIURIL) 25 MG tablet [Pharmacy Med Name: HYDROCHLOROTHIAZIDE 25 MG TAB] 90 tablet 0     Sig: TAKE 1 TABLET BY MOUTH EVERY DAY       Last Filled:      Patient Phone Number: [phone] (home)     Last appt: 11/9/2022   Next appt: Visit date not found    Last BMP:   Lab Results   Component Value Date/Time     11/09/2022 02:40 PM    K 3.8 11/09/2022 02:40 PM    CL 99 11/09/2022 02:40 PM    CO2 24 11/09/2022 02:40 PM    ANIONGAP 13 11/09/2022 02:40 PM    GLUCOSE 117 11/09/2022 02:40 PM    BUN 12 11/09/2022 02:40 PM    CREATININE 0.7 11/09/2022 02:40 PM    LABGLOM >60 11/09/2022 02:40 PM    GFRAA >60 08/15/2022 12:31 PM    CALCIUM 10.1 11/09/2022 02:40 PM      Last CMP:   Lab Results   Component Value Date/Time     11/09/2022 02:40 PM    K 3.8 11/09/2022 02:40 PM    CL 99 11/09/2022 02:40 PM    CO2 24 11/09/2022 02:40 PM    ANIONGAP 13 11/09/2022 02:40 PM    GLUCOSE 117 11/09/2022 02:40 PM    BUN 12 11/09/2022 02:40 PM    CREATININE 0.7 11/09/2022 02:40 PM    LABGLOM >60 11/09/2022 02:40 PM    GFRAA >60 08/15/2022 12:31 PM    PROT 7.7 08/15/2022 12:31 PM    LABALBU 4.5 08/15/2022 12:31 PM    AGRATIO 1.3 06/20/2018 11:06 AM    BILITOT 0.6 08/15/2022 12:31 PM    ALKPHOS 78 08/15/2022 12:31 PM    ALT 13 08/15/2022 12:31 PM    AST 19 08/15/2022 12:31 PM    GLOB 3.7 06/20/2018 11:06 AM     Last Renal Function:   Lab Results   Component Value Date/Time     11/09/2022 02:40 PM    K 3.8 11/09/2022 02:40 PM    CL 99 11/09/2022 02:40 PM    CO2 24 11/09/2022 02:40 PM    GLUCOSE 117 11/09/2022 02:40 PM    BUN 12 11/09/2022 02:40 PM    CREATININE 0.7 11/09/2022 02:40 PM    LABALBU 4.5 08/15/2022 12:31 PM    CALCIUM 10.1 11/09/2022 02:40 PM    GFRAA >60 08/15/2022 12:31 PM       Last OARRS: No flowsheet data found.    Preferred Pharmacy:   [address] [facility]  [address]   [place] [facility] [address]  Phone: [phone] Fax: [phone]

## 2022-12-09 NOTE — TELEPHONE ENCOUNTER
Medication:   Requested Prescriptions     Pending Prescriptions Disp Refills    atorvastatin (LIPITOR) 10 MG tablet [Pharmacy Med Name: ATORVASTATIN 10 MG TABLET] 90 tablet 0     Sig: TAKE 1 TABLET BY MOUTH EVERY DAY     Last Filled:  9/12/22    Last appt: 11/9/2022   Next appt: Visit date not found    Last Lipid:   Lab Results   Component Value Date/Time    CHOL 170 08/15/2022 12:31 PM    TRIG 77 08/15/2022 12:31 PM    HDL 76 08/15/2022 12:31 PM    [address] 08/15/2022 12:31 PM

## 2022-12-12 RX ORDER — ATORVASTATIN CALCIUM 10 MG/1
TABLET, FILM COATED ORAL
Qty: 90 TABLET | Refills: 0 | Status: SHIPPED | OUTPATIENT
Start: 2022-12-12

## 2022-12-27 RX ORDER — AMLODIPINE BESYLATE 10 MG/1
TABLET ORAL
Qty: 30 TABLET | Refills: 0 | Status: SHIPPED | OUTPATIENT
Start: 2022-12-27

## 2022-12-27 NOTE — TELEPHONE ENCOUNTER
Medication:   Requested Prescriptions     Pending Prescriptions Disp Refills    amLODIPine (NORVASC) 10 MG tablet [Pharmacy Med Name: AMLODIPINE BESYLATE 10 MG TAB] 30 tablet 0     Sig: TAKE 1 TABLET BY MOUTH EVERY DAY     Last Filled:  09/12/2022    Last appt: 11/9/2022   Next appt: Visit date not found    Last Labs DM:   Lab Results   Component Value Date/Time    LABA1C 7.8 11/09/2022 02:40 PM     Last Lipid:   Lab Results   Component Value Date/Time    CHOL 170 08/15/2022 12:31 PM    TRIG 77 08/15/2022 12:31 PM    HDL 76 08/15/2022 12:31 PM    [address] 08/15/2022 12:31 PM     Last PSA: No results found for: PSA  Last Thyroid:   Lab Results   Component Value Date/Time    TSH 1.80 08/15/2022 12:31 PM

## 2023-01-09 ENCOUNTER — HOSPITAL ENCOUNTER (OUTPATIENT)
Dept: WOMENS IMAGING | Age: 63
Discharge: HOME | End: 2023-01-09
Payer: COMMERCIAL

## 2023-01-09 VITALS — WEIGHT: 185 LBS | HEIGHT: 66 IN | BODY MASS INDEX: 29.73 KG/M2

## 2023-01-09 DIAGNOSIS — Z12.31 VISIT FOR SCREENING MAMMOGRAM: ICD-10-CM

## 2023-01-09 PROCEDURE — 77067 SCR MAMMO BI INCL CAD: CPT

## 2023-01-10 ENCOUNTER — TELEPHONE (OUTPATIENT)
Dept: PRIMARY CARE CLINIC | Age: 63
End: 2023-01-10

## 2023-01-10 DIAGNOSIS — E11.9 TYPE 2 DIABETES MELLITUS WITHOUT COMPLICATION, WITHOUT LONG-TERM CURRENT USE OF INSULIN (HCC): Primary | ICD-10-CM

## 2023-01-10 NOTE — TELEPHONE ENCOUNTER
----- Message from Moris Vega sent at 1/10/2023  3:07 PM EST -----  Subject: Message to Provider    QUESTIONS  Information for Provider? PATIENT IS SCHEDULED FOR HER 3 MONTH F/U ON   3/13/23 AND WOULD LIKE HER LABS ORDERED PRIOR TO HER VISIT.   ---------------------------------------------------------------------------  --------------  [place] [facility]  [phone]; OK to leave message on voicemail  ---------------------------------------------------------------------------  --------------  SCRIPT ANSWERS  Relationship to Patient?  Self

## 2023-01-20 RX ORDER — AMLODIPINE BESYLATE 10 MG/1
TABLET ORAL
Qty: 30 TABLET | Refills: 0 | Status: SHIPPED | OUTPATIENT
Start: 2023-01-20

## 2023-01-20 NOTE — TELEPHONE ENCOUNTER
Medication:   Requested Prescriptions     Pending Prescriptions Disp Refills    amLODIPine (NORVASC) 10 MG tablet [Pharmacy Med Name: AMLODIPINE BESYLATE 10 MG TAB] 30 tablet 0     Sig: TAKE 1 TABLET BY MOUTH EVERY DAY     Last Filled:  12/27/2022    Last appt: 11/9/2022   Next appt: 3/13/2023    Last OARRS: No flowsheet data found.

## 2023-02-15 NOTE — TELEPHONE ENCOUNTER
Medication:   Requested Prescriptions     Pending Prescriptions Disp Refills    amLODIPine (NORVASC) 10 MG tablet [Pharmacy Med Name: AMLODIPINE BESYLATE 10 MG TAB] 30 tablet 0     Sig: TAKE 1 TABLET BY MOUTH EVERY DAY       Last Filled:      Patient Phone Number: [phone] (home)     Last appt: 11/9/2022   Next appt: 3/13/2023    Last BMP:   Lab Results   Component Value Date/Time     11/09/2022 02:40 PM    K 3.8 11/09/2022 02:40 PM    CL 99 11/09/2022 02:40 PM    CO2 24 11/09/2022 02:40 PM    ANIONGAP 13 11/09/2022 02:40 PM    GLUCOSE 117 11/09/2022 02:40 PM    BUN 12 11/09/2022 02:40 PM    CREATININE 0.7 11/09/2022 02:40 PM    LABGLOM >60 11/09/2022 02:40 PM    GFRAA >60 08/15/2022 12:31 PM    CALCIUM 10.1 11/09/2022 02:40 PM      Last CMP:   Lab Results   Component Value Date/Time     11/09/2022 02:40 PM    K 3.8 11/09/2022 02:40 PM    CL 99 11/09/2022 02:40 PM    CO2 24 11/09/2022 02:40 PM    ANIONGAP 13 11/09/2022 02:40 PM    GLUCOSE 117 11/09/2022 02:40 PM    BUN 12 11/09/2022 02:40 PM    CREATININE 0.7 11/09/2022 02:40 PM    LABGLOM >60 11/09/2022 02:40 PM    GFRAA >60 08/15/2022 12:31 PM    PROT 7.7 08/15/2022 12:31 PM    LABALBU 4.5 08/15/2022 12:31 PM    AGRATIO 1.3 06/20/2018 11:06 AM    BILITOT 0.6 08/15/2022 12:31 PM    ALKPHOS 78 08/15/2022 12:31 PM    ALT 13 08/15/2022 12:31 PM    AST 19 08/15/2022 12:31 PM    GLOB 3.7 06/20/2018 11:06 AM     Last Renal Function:   Lab Results   Component Value Date/Time     11/09/2022 02:40 PM    K 3.8 11/09/2022 02:40 PM    CL 99 11/09/2022 02:40 PM    CO2 24 11/09/2022 02:40 PM    GLUCOSE 117 11/09/2022 02:40 PM    BUN 12 11/09/2022 02:40 PM    CREATININE 0.7 11/09/2022 02:40 PM    LABALBU 4.5 08/15/2022 12:31 PM    CALCIUM 10.1 11/09/2022 02:40 PM    GFRAA >60 08/15/2022 12:31 PM       Last OARRS: No flowsheet data found.     Preferred Pharmacy:   [facility], [address] [facility] [phone] - F [phone] [address] [place] [address]  Phone: [phone] Fax: [phone]:   Requested Prescriptions     Pending Prescriptions Disp Refills    amLODIPine (NORVASC) 10 MG tablet [Pharmacy Med Name: AMLODIPINE BESYLATE 10 MG TAB] 30 tablet 0     Sig: TAKE 1 TABLET BY MOUTH EVERY DAY       Last Filled:      Patient Phone Number: [phone] (home)     Last appt: 11/9/2022   Next appt: 3/13/2023    Last BMP:   Lab Results   Component Value Date/Time     11/09/2022 02:40 PM    K 3.8 11/09/2022 02:40 PM    CL 99 11/09/2022 02:40 PM    CO2 24 11/09/2022 02:40 PM    ANIONGAP 13 11/09/2022 02:40 PM    GLUCOSE 117 11/09/2022 02:40 PM    BUN 12 11/09/2022 02:40 PM    CREATININE 0.7 11/09/2022 02:40 PM    LABGLOM >60 11/09/2022 02:40 PM    GFRAA >60 08/15/2022 12:31 PM    CALCIUM 10.1 11/09/2022 02:40 PM      Last CMP:   Lab Results   Component Value Date/Time     11/09/2022 02:40 PM    K 3.8 11/09/2022 02:40 PM    CL 99 11/09/2022 02:40 PM    CO2 24 11/09/2022 02:40 PM    ANIONGAP 13 11/09/2022 02:40 PM    GLUCOSE 117 11/09/2022 02:40 PM    BUN 12 11/09/2022 02:40 PM    CREATININE 0.7 11/09/2022 02:40 PM    LABGLOM >60 11/09/2022 02:40 PM    GFRAA >60 08/15/2022 12:31 PM    PROT 7.7 08/15/2022 12:31 PM    LABALBU 4.5 08/15/2022 12:31 PM    AGRATIO 1.3 06/20/2018 11:06 AM    BILITOT 0.6 08/15/2022 12:31 PM    ALKPHOS 78 08/15/2022 12:31 PM    ALT 13 08/15/2022 12:31 PM    AST 19 08/15/2022 12:31 PM    GLOB 3.7 06/20/2018 11:06 AM     Last Renal Function:   Lab Results   Component Value Date/Time     11/09/2022 02:40 PM    K 3.8 11/09/2022 02:40 PM    CL 99 11/09/2022 02:40 PM    CO2 24 11/09/2022 02:40 PM    GLUCOSE 117 11/09/2022 02:40 PM    BUN 12 11/09/2022 02:40 PM    CREATININE 0.7 11/09/2022 02:40 PM    LABALBU 4.5 08/15/2022 12:31 PM    CALCIUM 10.1 11/09/2022 02:40 PM    GFRAA >60 08/15/2022 12:31 PM       Last OARRS: No flowsheet data found.     Preferred Pharmacy:   [facility], [address] [facility]  [address]   [place] [address]  Phone: [phone] Fax: [phone]

## 2023-02-17 RX ORDER — POTASSIUM CHLORIDE 600 MG/1
CAPSULE, EXTENDED RELEASE ORAL
Qty: 180 CAPSULE | Refills: 0 | Status: SHIPPED | OUTPATIENT
Start: 2023-02-17

## 2023-02-17 RX ORDER — AMLODIPINE BESYLATE 10 MG/1
TABLET ORAL
Qty: 30 TABLET | Refills: 0 | Status: SHIPPED | OUTPATIENT
Start: 2023-02-17

## 2023-02-17 NOTE — TELEPHONE ENCOUNTER
Medication:   Requested Prescriptions     Pending Prescriptions Disp Refills    potassium chloride 8 MEQ CPCR [Pharmacy Med Name: POTASSIUM CL ER 8 MEQ CAPSULE] 180 capsule 0     Sig: TAKE 1 CAPSULE BY MOUTH TWICE A DAY        Last Filled:  11/21/22    Patient Phone Number: [phone] (home)     Last appt: 11/9/2022   Next appt: 3/13/2023    Last OARRS: No flowsheet data found.   Medication pended

## 2023-03-06 RX ORDER — HYDROCHLOROTHIAZIDE 25 MG/1
TABLET ORAL
Qty: 90 TABLET | Refills: 0 | Status: SHIPPED | OUTPATIENT
Start: 2023-03-06

## 2023-03-06 RX ORDER — METFORMIN HYDROCHLORIDE 500 MG/1
TABLET, EXTENDED RELEASE ORAL
Qty: 90 TABLET | Refills: 1 | Status: SHIPPED | OUTPATIENT
Start: 2023-03-06

## 2023-03-06 NOTE — TELEPHONE ENCOUNTER
Medication:   Requested Prescriptions     Pending Prescriptions Disp Refills    metFORMIN (GLUCOPHAGE-XR) 500 MG extended release tablet [Pharmacy Med Name: METFORMIN HCL  MG TABLET] 90 tablet 1     Sig: TAKE 1 TABLET BY MOUTH EVERY DAY WITH BREAKFAST    hydroCHLOROthiazide (HYDRODIURIL) 25 MG tablet [Pharmacy Med Name: HYDROCHLOROTHIAZIDE 25 MG TAB] 90 tablet 0     Sig: TAKE 1 TABLET BY MOUTH EVERY DAY       Last Filled:      Patient Phone Number: [phone] (home)     Last appt: 11/9/2022   Next appt: 3/13/2023    Last BMP:   Lab Results   Component Value Date/Time     11/09/2022 02:40 PM    K 3.8 11/09/2022 02:40 PM    CL 99 11/09/2022 02:40 PM    CO2 24 11/09/2022 02:40 PM    ANIONGAP 13 11/09/2022 02:40 PM    GLUCOSE 117 11/09/2022 02:40 PM    BUN 12 11/09/2022 02:40 PM    CREATININE 0.7 11/09/2022 02:40 PM    LABGLOM >60 11/09/2022 02:40 PM    GFRAA >60 08/15/2022 12:31 PM    CALCIUM 10.1 11/09/2022 02:40 PM      Last CMP:   Lab Results   Component Value Date/Time     11/09/2022 02:40 PM    K 3.8 11/09/2022 02:40 PM    CL 99 11/09/2022 02:40 PM    CO2 24 11/09/2022 02:40 PM    ANIONGAP 13 11/09/2022 02:40 PM    GLUCOSE 117 11/09/2022 02:40 PM    BUN 12 11/09/2022 02:40 PM    CREATININE 0.7 11/09/2022 02:40 PM    LABGLOM >60 11/09/2022 02:40 PM    GFRAA >60 08/15/2022 12:31 PM    PROT 7.7 08/15/2022 12:31 PM    LABALBU 4.5 08/15/2022 12:31 PM    AGRATIO 1.3 06/20/2018 11:06 AM    BILITOT 0.6 08/15/2022 12:31 PM    ALKPHOS 78 08/15/2022 12:31 PM    ALT 13 08/15/2022 12:31 PM    AST 19 08/15/2022 12:31 PM    GLOB 3.7 06/20/2018 11:06 AM     Last Renal Function:   Lab Results   Component Value Date/Time     11/09/2022 02:40 PM    K 3.8 11/09/2022 02:40 PM    CL 99 11/09/2022 02:40 PM    CO2 24 11/09/2022 02:40 PM    GLUCOSE 117 11/09/2022 02:40 PM    BUN 12 11/09/2022 02:40 PM    CREATININE 0.7 11/09/2022 02:40 PM    LABALBU 4.5 08/15/2022 12:31 PM    CALCIUM 10.1 11/09/2022 02:40 PM GFRAA >60 08/15/2022 12:31 PM       Last OARRS: No flowsheet data found. Preferred Pharmacy:   [facility], [address] [facility]  [address]   [place] [address]  Phone: [phone] Fax: [phone]:   Requested Prescriptions     Pending Prescriptions Disp Refills    metFORMIN (GLUCOPHAGE-XR) 500 MG extended release tablet [Pharmacy Med Name: METFORMIN HCL  MG TABLET] 90 tablet 1     Sig: TAKE 1 TABLET BY MOUTH EVERY DAY WITH BREAKFAST    hydroCHLOROthiazide (HYDRODIURIL) 25 MG tablet [Pharmacy Med Name: HYDROCHLOROTHIAZIDE 25 MG TAB] 90 tablet 0     Sig: TAKE 1 TABLET BY MOUTH EVERY DAY       Last Filled:      Patient Phone Number: [phone] (home)     Last appt: 11/9/2022   Next appt: 3/13/2023    Last BMP:   Lab Results   Component Value Date/Time     11/09/2022 02:40 PM    K 3.8 11/09/2022 02:40 PM    CL 99 11/09/2022 02:40 PM    CO2 24 11/09/2022 02:40 PM    ANIONGAP 13 11/09/2022 02:40 PM    GLUCOSE 117 11/09/2022 02:40 PM    BUN 12 11/09/2022 02:40 PM    CREATININE 0.7 11/09/2022 02:40 PM    LABGLOM >60 11/09/2022 02:40 PM    GFRAA >60 08/15/2022 12:31 PM    CALCIUM 10.1 11/09/2022 02:40 PM      Last CMP:   Lab Results   Component Value Date/Time     11/09/2022 02:40 PM    K 3.8 11/09/2022 02:40 PM    CL 99 11/09/2022 02:40 PM    CO2 24 11/09/2022 02:40 PM    ANIONGAP 13 11/09/2022 02:40 PM    GLUCOSE 117 11/09/2022 02:40 PM    BUN 12 11/09/2022 02:40 PM    CREATININE 0.7 11/09/2022 02:40 PM    LABGLOM >60 11/09/2022 02:40 PM    GFRAA >60 08/15/2022 12:31 PM    PROT 7.7 08/15/2022 12:31 PM    LABALBU 4.5 08/15/2022 12:31 PM    AGRATIO 1.3 06/20/2018 11:06 AM    BILITOT 0.6 08/15/2022 12:31 PM    ALKPHOS 78 08/15/2022 12:31 PM    ALT 13 08/15/2022 12:31 PM    AST 19 08/15/2022 12:31 PM    GLOB 3.7 06/20/2018 11:06 AM     Last Renal Function:   Lab Results   Component Value Date/Time     11/09/2022 02:40 PM    K 3.8 11/09/2022 02:40 PM    CL 99 11/09/2022 02:40 PM    CO2 24 11/09/2022 02:40 PM    GLUCOSE 117 11/09/2022 02:40 PM    BUN 12 11/09/2022 02:40 PM    CREATININE 0.7 11/09/2022 02:40 PM    LABALBU 4.5 08/15/2022 12:31 PM    CALCIUM 10.1 11/09/2022 02:40 PM    GFRAA >60 08/15/2022 12:31 PM       Last OARRS: No flowsheet data found. Preferred Pharmacy:   [facility], [address] [facility]  [address]   [place]  [address]  Phone: [phone] Fax: [phone]

## 2023-03-09 DIAGNOSIS — E11.9 TYPE 2 DIABETES MELLITUS WITHOUT COMPLICATION, WITHOUT LONG-TERM CURRENT USE OF INSULIN (HCC): ICD-10-CM

## 2023-03-09 LAB
ALBUMIN SERPL-MCNC: 4.5 G/DL (ref 3.4–5)
ALP BLD-CCNC: 106 U/L (ref 40–129)
ALT SERPL-CCNC: 22 U/L (ref 10–40)
ANION GAP SERPL CALCULATED.3IONS-SCNC: 12 MMOL/L (ref 3–16)
AST SERPL-CCNC: 23 U/L (ref 15–37)
BILIRUB SERPL-MCNC: 0.7 MG/DL (ref 0–1)
BILIRUBIN DIRECT: <0.2 MG/DL (ref 0–0.3)
BILIRUBIN, INDIRECT: ABNORMAL MG/DL (ref 0–1)
BUN BLDV-MCNC: 13 MG/DL (ref 7–20)
CALCIUM SERPL-MCNC: 9.9 MG/DL (ref 8.3–10.6)
CHLORIDE BLD-SCNC: 104 MMOL/L (ref 99–110)
CHOLESTEROL, TOTAL: 128 MG/DL (ref 0–199)
CO2: 25 MMOL/L (ref 21–32)
CREAT SERPL-MCNC: 0.8 MG/DL (ref 0.6–1.2)
FOLATE: 15.52 NG/ML (ref 4.78–24.2)
GFR SERPL CREATININE-BSD FRML MDRD: >60 ML/MIN/{1.73_M2}
GLUCOSE BLD-MCNC: 179 MG/DL (ref 70–99)
HCT VFR BLD CALC: 42.5 % (ref 36–48)
HDLC SERPL-MCNC: 59 MG/DL (ref 40–60)
HEMOGLOBIN: 14.2 G/DL (ref 12–16)
LDL CHOLESTEROL CALCULATED: 56 MG/DL
MCH RBC QN AUTO: 29.9 PG (ref 26–34)
MCHC RBC AUTO-ENTMCNC: 33.3 G/DL (ref 31–36)
MCV RBC AUTO: 89.8 FL (ref 80–100)
PDW BLD-RTO: 12.7 % (ref 12.4–15.4)
PLATELET # BLD: 226 K/UL (ref 135–450)
PMV BLD AUTO: 9.8 FL (ref 5–10.5)
POTASSIUM SERPL-SCNC: 4.2 MMOL/L (ref 3.5–5.1)
RBC # BLD: 4.73 M/UL (ref 4–5.2)
SODIUM BLD-SCNC: 141 MMOL/L (ref 136–145)
TOTAL PROTEIN: 8.3 G/DL (ref 6.4–8.2)
TRIGL SERPL-MCNC: 67 MG/DL (ref 0–150)
TSH SERPL DL<=0.05 MIU/L-ACNC: 1.53 UIU/ML (ref 0.27–4.2)
VITAMIN B-12: 1222 PG/ML (ref 211–911)
VLDLC SERPL CALC-MCNC: 13 MG/DL
WBC # BLD: 5.6 K/UL (ref 4–11)

## 2023-03-10 LAB
ESTIMATED AVERAGE GLUCOSE: 185.8 MG/DL
HBA1C MFR BLD: 8.1 %

## 2023-03-11 SDOH — ECONOMIC STABILITY: FOOD INSECURITY: WITHIN THE PAST 12 MONTHS, THE FOOD YOU BOUGHT JUST DIDN'T LAST AND YOU DIDN'T HAVE MONEY TO GET MORE.: NEVER TRUE

## 2023-03-11 SDOH — ECONOMIC STABILITY: FOOD INSECURITY: WITHIN THE PAST 12 MONTHS, YOU WORRIED THAT YOUR FOOD WOULD RUN OUT BEFORE YOU GOT MONEY TO BUY MORE.: NEVER TRUE

## 2023-03-11 SDOH — ECONOMIC STABILITY: INCOME INSECURITY: HOW HARD IS IT FOR YOU TO PAY FOR THE VERY BASICS LIKE FOOD, HOUSING, MEDICAL CARE, AND HEATING?: NOT HARD AT ALL

## 2023-03-13 ENCOUNTER — OFFICE VISIT (OUTPATIENT)
Dept: PRIMARY CARE CLINIC | Age: 63
End: 2023-03-13
Payer: COMMERCIAL

## 2023-03-13 VITALS
HEART RATE: 79 BPM | OXYGEN SATURATION: 97 % | TEMPERATURE: 97.9 F | WEIGHT: 189 LBS | BODY MASS INDEX: 30.37 KG/M2 | HEIGHT: 66 IN | DIASTOLIC BLOOD PRESSURE: 62 MMHG | SYSTOLIC BLOOD PRESSURE: 126 MMHG | RESPIRATION RATE: 12 BRPM

## 2023-03-13 DIAGNOSIS — E11.9 TYPE 2 DIABETES MELLITUS WITHOUT COMPLICATION, WITHOUT LONG-TERM CURRENT USE OF INSULIN (HCC): Primary | ICD-10-CM

## 2023-03-13 DIAGNOSIS — E78.2 MIXED HYPERLIPIDEMIA: ICD-10-CM

## 2023-03-13 DIAGNOSIS — I10 HYPERTENSION, BENIGN: ICD-10-CM

## 2023-03-13 PROCEDURE — 99214 OFFICE O/P EST MOD 30 MIN: CPT | Performed by: FAMILY MEDICINE

## 2023-03-13 PROCEDURE — 3078F DIAST BP <80 MM HG: CPT | Performed by: FAMILY MEDICINE

## 2023-03-13 PROCEDURE — 3052F HG A1C>EQUAL 8.0%<EQUAL 9.0%: CPT | Performed by: FAMILY MEDICINE

## 2023-03-13 PROCEDURE — 3074F SYST BP LT 130 MM HG: CPT | Performed by: FAMILY MEDICINE

## 2023-03-13 RX ORDER — METFORMIN HYDROCHLORIDE 500 MG/1
1000 TABLET, EXTENDED RELEASE ORAL 2 TIMES DAILY
Qty: 120 TABLET | Refills: 2 | Status: SHIPPED | OUTPATIENT
Start: 2023-03-13

## 2023-03-13 RX ORDER — ORAL SEMAGLUTIDE 3 MG/1
3 TABLET ORAL DAILY
Qty: 30 TABLET | Refills: 2 | Status: SHIPPED | OUTPATIENT
Start: 2023-03-13

## 2023-03-13 SDOH — ECONOMIC STABILITY: FOOD INSECURITY: WITHIN THE PAST 12 MONTHS, THE FOOD YOU BOUGHT JUST DIDN'T LAST AND YOU DIDN'T HAVE MONEY TO GET MORE.: NEVER TRUE

## 2023-03-13 SDOH — ECONOMIC STABILITY: INCOME INSECURITY: HOW HARD IS IT FOR YOU TO PAY FOR THE VERY BASICS LIKE FOOD, HOUSING, MEDICAL CARE, AND HEATING?: NOT HARD AT ALL

## 2023-03-13 SDOH — ECONOMIC STABILITY: FOOD INSECURITY: WITHIN THE PAST 12 MONTHS, YOU WORRIED THAT YOUR FOOD WOULD RUN OUT BEFORE YOU GOT MONEY TO BUY MORE.: NEVER TRUE

## 2023-03-13 ASSESSMENT — PATIENT HEALTH QUESTIONNAIRE - PHQ9
SUM OF ALL RESPONSES TO PHQ QUESTIONS 1-9: 0
SUM OF ALL RESPONSES TO PHQ QUESTIONS 1-9: 0
8. MOVING OR SPEAKING SO SLOWLY THAT OTHER PEOPLE COULD HAVE NOTICED. OR THE OPPOSITE, BEING SO FIGETY OR RESTLESS THAT YOU HAVE BEEN MOVING AROUND A LOT MORE THAN USUAL: 0
7. TROUBLE CONCENTRATING ON THINGS, SUCH AS READING THE NEWSPAPER OR WATCHING TELEVISION: 0
1. LITTLE INTEREST OR PLEASURE IN DOING THINGS: 0
3. TROUBLE FALLING OR STAYING ASLEEP: 0
SUM OF ALL RESPONSES TO PHQ QUESTIONS 1-9: 0
9. THOUGHTS THAT YOU WOULD BE BETTER OFF DEAD, OR OF HURTING YOURSELF: 0
SUM OF ALL RESPONSES TO PHQ QUESTIONS 1-9: 0
10. IF YOU CHECKED OFF ANY PROBLEMS, HOW DIFFICULT HAVE THESE PROBLEMS MADE IT FOR YOU TO DO YOUR WORK, TAKE CARE OF THINGS AT HOME, OR GET ALONG WITH OTHER PEOPLE: 0
2. FEELING DOWN, DEPRESSED OR HOPELESS: 0
4. FEELING TIRED OR HAVING LITTLE ENERGY: 0
6. FEELING BAD ABOUT YOURSELF - OR THAT YOU ARE A FAILURE OR HAVE LET YOURSELF OR YOUR FAMILY DOWN: 0
SUM OF ALL RESPONSES TO PHQ9 QUESTIONS 1 & 2: 0
5. POOR APPETITE OR OVEREATING: 0

## 2023-03-13 NOTE — PROGRESS NOTES
Subjective:      Patient ID: Jacob Mckeon is a 60 y.o. female. HPI     Diabetes Mellitus Type II, Follow-up: Patient here for follow-up of Type 2 diabetes mellitus. Current symptoms/problems include none and have been stable. Symptoms have been present for several years. Known diabetic complications: none  Cardiovascular risk factors: advanced age (older than 54 for men, 72 for women), diabetes mellitus, hypertension and obesity (BMI >= 30 kg/m2)  Current diabetic medications include  see med's list  .     Eye exam current (within one year): unknown  Weight trend: stable  Prior visit with dietician: no  Current diet: in general, an \"unhealthy\" diet  Current exercise: none    Current monitoring regimen: none  Home blood sugar records: patient does not test  Any episodes of hypoglycemia? no    Is She on ACE inhibitor or angiotensin II receptor blocker? Yes   lisinopril (generic)  Only medication she is currently taking it is Jardiance 10 mg  After she lost significant weight she put it back  She is still struggle with alcohol consumption now completely off alcohol due to that she has been eating more sweet stuff she is in a support group now she is can work on it her blood glucose has gotten worse along with overall control  Hypertension: Patient here for follow-up of elevated blood pressure. She is exercising and is adherent to low salt diet. Blood pressure is well controlled at home. Cardiac symptoms none. Patient denies chest pain, chest pressure/discomfort, exertional chest pressure/discomfort, fatigue, lower extremity edema, near-syncope, orthopnea, palpitations, paroxysmal nocturnal dyspnea, syncope and tachypnea. Cardiovascular risk factors: diabetes mellitus and hypertension. Use of agents associated with hypertension: none. History of target organ damage: none. Hyperlipidemia: Patient presents with hyperlipidemia. She was tested because DM. Her last labs showed Total cholesterol see lab,. none. There is a family history of hyperlipidemia. There is a family history of early ischemia heart disease. Pt with depression doing well on current med's no SE      Review of Systems   Constitutional: Negative. HENT: Negative. Eyes: Negative. Respiratory: Negative. Cardiovascular: Negative. Gastrointestinal: Negative. Endocrine: Negative. Genitourinary: Negative. Musculoskeletal: Negative. Skin: Negative. Allergic/Immunologic: Negative. Neurological: Negative. Hematological: Negative. Psychiatric/Behavioral:. Negative for hallucinations, behavioral problems, confusion, sleep disturbance, self-injury, dysphoric mood and decreased concentration. . The patient is not hyperactive. All other systems reviewed and are negative.   Past Medical History:   Diagnosis Date    Hypertension     Type 2 diabetes mellitus without complication ([facility] [address] Unsure      Past Surgical History:   Procedure Laterality Date    CT NEEDLE BIOPSY LIVER PERCUTANEOUS  11/9/2021    CT NEEDLE BIOPSY LIVER PERCUTANEOUS 11/9/2021 [facility] CT SCAN     Family History   Problem Relation Age of Onset    Diabetes Mother     Stroke Mother     High Blood Pressure Mother     Arthritis Mother     High Cholesterol Mother     Diabetes Father     High Blood Pressure Father     Cancer Father         Brain tumor @age 50     High Blood Pressure Brother     High Cholesterol Brother     Other Brother         Neurosarcoidosis /Brain stem    Breast Cancer Paternal Cousin      Social History     Socioeconomic History    Marital status:      Spouse name: Doretha Moss     Number of children: 1    Years of education: None    Highest education level: None   Occupational History    Occupation: retired      Comment: office work   Tobacco Use    Smoking status: Never    Smokeless tobacco: Never   Substance and Sexual Activity    Alcohol use: Not Currently     Alcohol/week: 1.0 standard drink     Types: 1 Shots of liquor per week    Drug use: No    Sexual activity: Yes     Partners: Male     Comment: M daughter 28 y o     Social Determinants of Health     Financial Resource Strain: Low Risk     Difficulty of Paying Living Expenses: Not hard at all   Food Insecurity: No Food Insecurity    Worried About [address] in the Last Year: Never true    [address] [facility] [address] in the Last Year: Never true   Transportation Needs: No Transportation Needs    Lack of Transportation (Medical): No    Lack of Transportation (Non-Medical): No   Housing Stability: Unknown    Unstable Housing in the Last Year: No     Current Outpatient Medications   Medication Sig Dispense Refill    Semaglutide (RYBELSUS) 3 MG TABS Take 3 mg by mouth daily 30 tablet 2    metFORMIN (GLUCOPHAGE-XR) 500 MG extended release tablet Take 2 tablets by mouth in the morning and at bedtime 120 tablet 2    hydroCHLOROthiazide (HYDRODIURIL) 25 MG tablet TAKE 1 TABLET BY MOUTH EVERY DAY 90 tablet 0    amLODIPine (NORVASC) 10 MG tablet TAKE 1 TABLET BY MOUTH EVERY DAY 30 tablet 0    potassium chloride 8 MEQ CPCR TAKE 1 CAPSULE BY MOUTH TWICE A  capsule 0    atorvastatin (LIPITOR) 10 MG tablet TAKE 1 TABLET BY MOUTH EVERY DAY 90 tablet 0    meloxicam (MOBIC) 15 MG tablet Take 1 tablet by mouth daily 30 tablet 1    Multiple Vitamin (MULTIVITAMIN ADULT) TABS Take 1 tablet by mouth daily      albuterol sulfate HFA (VENTOLIN HFA) 108 (90 Base) MCG/ACT inhaler Inhale 2 puffs into the lungs 4 times daily as needed for Wheezing or Shortness of Breath (cough) 54 g 0    Probiotic Product (PROBIOTIC DAILY PO) Take by mouth      ONE TOUCH LANCETS MISC 1 each by Does not apply route daily 100 each 3    Blood Glucose Monitoring Suppl (ONE TOUCH BASIC SYSTEM) W/DEVICE KIT 1 kit by Does not apply route daily as needed (does not apply) 1 kit 0    glucose blood VI test strips (ASCENSIA AUTODISC VI;ONE TOUCH ULTRA TEST VI) strip 1 each by In Vitro route daily As needed.  BRAND ONE TOUCH 100 each 3     No current facility-administered medications for this visit. No Known Allergies      Objective:   Physical Exam   Constitutional: She appears well-developed and well-nourished. No distress. HENT:   Head: Normocephalic and atraumatic. Right Ear: External ear normal.   Left Ear: External ear normal.   Nose: Nose normal.   Mouth/Throat: Oropharynx is clear and moist. No oropharyngeal exudate. Eyes: Conjunctivae and OEM are normal. Pupils are equal, round, and reactive to light. Right eye exhibits no discharge. Left eye exhibits no discharge. No scleral icterus. Neck: Normal range of motion. Neck supple. No JVD present. No tracheal deviation present. No thyromegaly present. Cardiovascular: Normal rate, regular rhythm, normal heart sounds and intact distal pulses. Pulmonary/Chest: Effort normal and breath sounds normal. No stridor. No respiratory distress. She has no wheezes. She has no rales. She exhibits no tenderness. Abdominal: Soft. Bowel sounds are normal. She exhibits no distension and no mass. There is no tenderness. There is no rebound and no guarding. Musculoskeletal: Normal range of motion. She exhibits no edema or tenderness. Lymphadenopathy:     She has no cervical adenopathy. Skin: Skin is warm and dry. No rash noted. She is not diaphoretic. No erythema. No pallor. Pigmented areas on lower legs around the ankles  microfilaments test exam was negative   Vitals reviewed. Assessment:      Diagnosis Orders   1. Type 2 diabetes mellitus without complication, without long-term current use of insulin (HCC)  Basic Metabolic Panel    Hemoglobin A1C    Vitamin B12 & Folate     DIABETES FOOT EXAM     DIABETES EYE EXAM    Microalbumin / Creatinine Urine Ratio      2. Hypertension, benign        3.  Mixed hyperlipidemia              Plan:      See orders,   Reviewed the recent blood work in detail with the patient  Started Rybelsus  Close follow-up  Discussed side effect

## 2023-03-14 RX ORDER — ATORVASTATIN CALCIUM 10 MG/1
TABLET, FILM COATED ORAL
Qty: 90 TABLET | Refills: 1 | Status: SHIPPED | OUTPATIENT
Start: 2023-03-14

## 2023-03-16 RX ORDER — AMLODIPINE BESYLATE 10 MG/1
TABLET ORAL
Qty: 30 TABLET | Refills: 5 | Status: SHIPPED | OUTPATIENT
Start: 2023-03-16

## 2023-03-22 NOTE — TELEPHONE ENCOUNTER
Medication:   Requested Prescriptions     Pending Prescriptions Disp Refills    hydroCHLOROthiazide (HYDRODIURIL) 25 MG tablet [Pharmacy Med Name: HYDROCHLOROTHIAZIDE 25 MG TAB] 90 tablet 0     Sig: TAKE 1 TABLET BY MOUTH EVERY DAY     Last Filled:  03/06/2023    Last appt: 3/13/2023   Next appt: Visit date not found    Last OARRS: No flowsheet data found.

## 2023-03-23 RX ORDER — HYDROCHLOROTHIAZIDE 25 MG/1
TABLET ORAL
Qty: 90 TABLET | Refills: 0 | Status: SHIPPED | OUTPATIENT
Start: 2023-03-23

## 2023-04-10 ENCOUNTER — TELEPHONE (OUTPATIENT)
Dept: PRIMARY CARE CLINIC | Age: 63
End: 2023-04-10

## 2023-04-11 ENCOUNTER — TELEPHONE (OUTPATIENT)
Dept: ADMINISTRATIVE | Age: 63
End: 2023-04-11

## 2023-04-25 ENCOUNTER — TELEPHONE (OUTPATIENT)
Dept: PRIMARY CARE CLINIC | Age: 63
End: 2023-04-25

## 2023-04-26 ENCOUNTER — PATIENT MESSAGE (OUTPATIENT)
Dept: PRIMARY CARE CLINIC | Age: 63
End: 2023-04-26

## 2023-04-26 ENCOUNTER — TELEPHONE (OUTPATIENT)
Dept: PRIMARY CARE CLINIC | Age: 63
End: 2023-04-26

## 2023-04-26 NOTE — TELEPHONE ENCOUNTER
Medication:   Requested Prescriptions     Pending Prescriptions Disp Refills    EPINEPHrine (EPIPEN 2-CINTHYA) 0.3 MG/0.3ML SOAJ injection 0.3 mL 1     Sig: Inject 0.3 mLs into the skin once for 1 dose Use as directed for allergic reaction     Last Filled:  9.9.21    Last appt: 3/13/2023   Next appt: Visit date not found    Last OARRS: No flowsheet data found.

## 2023-04-26 NOTE — TELEPHONE ENCOUNTER
She  got a letter from  Eliseo '[facility]  that  trulicity    victoza  and  ozempic  She is taking  the metformin 1000 mg  twice a day

## 2023-04-26 NOTE — TELEPHONE ENCOUNTER
From: Klever Galan  To: Dr. Stanford Few: 4/26/2023 11:22 AM EDT  Subject: Epipen refill    Could you please submit a script for this to my pharmacy?

## 2023-04-27 RX ORDER — EPINEPHRINE 0.3 MG/.3ML
0.3 INJECTION SUBCUTANEOUS ONCE
Qty: 0.3 ML | Refills: 1 | Status: SHIPPED | OUTPATIENT
Start: 2023-04-27 | End: 2023-04-27

## 2023-04-27 RX ORDER — SEMAGLUTIDE 1.34 MG/ML
2 INJECTION, SOLUTION SUBCUTANEOUS WEEKLY
Qty: 4 ADJUSTABLE DOSE PRE-FILLED PEN SYRINGE | Refills: 2 | Status: SHIPPED | OUTPATIENT
Start: 2023-04-27

## 2023-04-28 ENCOUNTER — TELEPHONE (OUTPATIENT)
Dept: ADMINISTRATIVE | Age: 63
End: 2023-04-28

## 2023-04-28 NOTE — TELEPHONE ENCOUNTER
PA submitted VIA M for  Ozempic (0.25 or 0.5 MG/DOSE) 2MG/3ML pen-injectors  (Key: [id number]) PENDING    Message from Plan  Approved. This drug has been approved. Approved quantity: 3 units per 28 day(s). The drug has been approved from 04/14/2023 to 04/27/2024. Please call the pharmacy to process your prescription claim. Generic or biosimilar substitution may be required when available and preferred on the formulary.

## 2023-04-28 NOTE — TELEPHONE ENCOUNTER
Patient notified. She asked about the epi pen as well and let her know that was sent with to pharmacy as well.

## 2023-05-19 RX ORDER — POTASSIUM CHLORIDE 600 MG/1
CAPSULE, EXTENDED RELEASE ORAL
Qty: 180 CAPSULE | Refills: 0 | Status: SHIPPED | OUTPATIENT
Start: 2023-05-19

## 2023-05-19 NOTE — TELEPHONE ENCOUNTER
Medication:   Requested Prescriptions     Pending Prescriptions Disp Refills    potassium chloride 8 MEQ CPCR [Pharmacy Med Name: POTASSIUM CL ER 8 MEQ CAPSULE] 180 capsule 0     Sig: TAKE 1 CAPSULE BY MOUTH TWICE A DAY     Last Filled:  2/17/2023    Last appt: 3/13/2023   Next appt: Visit date not found

## 2023-05-30 RX ORDER — HYDROXYZINE HYDROCHLORIDE 25 MG/1
25 TABLET, FILM COATED ORAL EVERY 6 HOURS PRN
Qty: 30 TABLET | Refills: 0 | Status: SHIPPED | OUTPATIENT
Start: 2023-05-30 | End: 2023-06-29

## 2023-05-30 NOTE — TELEPHONE ENCOUNTER
Caller: Neha May    Relationship: Self    Best call back number: [phone]    What is the best time to reach you: ANYTIME    Who are you requesting to speak with (clinical staff, provider,  specific staff member): VICKIE ALLRED     What was the call regarding: PATIENT IS WANTING TO SEE IF SHE CAN NOW MOVE ON TO THE 1.O MG. ON THE WEVY     I   I sent ozempic

## 2023-06-19 RX ORDER — METFORMIN HYDROCHLORIDE 500 MG/1
1000 TABLET, EXTENDED RELEASE ORAL 2 TIMES DAILY
Qty: 120 TABLET | Refills: 2 | Status: SHIPPED | OUTPATIENT
Start: 2023-06-19

## 2023-07-31 DIAGNOSIS — E11.9 TYPE 2 DIABETES MELLITUS WITHOUT COMPLICATION, WITHOUT LONG-TERM CURRENT USE OF INSULIN (HCC): ICD-10-CM

## 2023-07-31 LAB
ANION GAP SERPL CALCULATED.3IONS-SCNC: 10 MMOL/L (ref 3–16)
BUN SERPL-MCNC: 10 MG/DL (ref 7–20)
CALCIUM SERPL-MCNC: 9.8 MG/DL (ref 8.3–10.6)
CHLORIDE SERPL-SCNC: 102 MMOL/L (ref 99–110)
CO2 SERPL-SCNC: 26 MMOL/L (ref 21–32)
CREAT SERPL-MCNC: 0.7 MG/DL (ref 0.6–1.2)
CREAT UR-MCNC: 205.6 MG/DL (ref 28–259)
EST. AVERAGE GLUCOSE BLD GHB EST-MCNC: 154.2 MG/DL
FOLATE SERPL-MCNC: 11.84 NG/ML (ref 4.78–24.2)
GFR SERPLBLD CREATININE-BSD FMLA CKD-EPI: >60 ML/MIN/{1.73_M2}
GLUCOSE SERPL-MCNC: 131 MG/DL (ref 70–99)
HBA1C MFR BLD: 7 %
MICROALBUMIN UR DL<=1MG/L-MCNC: <1.2 MG/DL
MICROALBUMIN/CREAT UR: NORMAL MG/G (ref 0–30)
POTASSIUM SERPL-SCNC: 4 MMOL/L (ref 3.5–5.1)
SODIUM SERPL-SCNC: 138 MMOL/L (ref 136–145)
VIT B12 SERPL-MCNC: 773 PG/ML (ref 211–911)

## 2023-08-03 ENCOUNTER — OFFICE VISIT (OUTPATIENT)
Dept: PRIMARY CARE CLINIC | Age: 63
End: 2023-08-03
Payer: COMMERCIAL

## 2023-08-03 VITALS
DIASTOLIC BLOOD PRESSURE: 70 MMHG | HEART RATE: 78 BPM | WEIGHT: 183.6 LBS | BODY MASS INDEX: 29.51 KG/M2 | OXYGEN SATURATION: 99 % | SYSTOLIC BLOOD PRESSURE: 120 MMHG | HEIGHT: 66 IN

## 2023-08-03 DIAGNOSIS — I10 HYPERTENSION, BENIGN: ICD-10-CM

## 2023-08-03 DIAGNOSIS — E11.9 TYPE 2 DIABETES MELLITUS WITHOUT COMPLICATION, WITHOUT LONG-TERM CURRENT USE OF INSULIN (HCC): Primary | ICD-10-CM

## 2023-08-03 DIAGNOSIS — E78.2 MIXED HYPERLIPIDEMIA: ICD-10-CM

## 2023-08-03 PROCEDURE — 3051F HG A1C>EQUAL 7.0%<8.0%: CPT | Performed by: FAMILY MEDICINE

## 2023-08-03 PROCEDURE — 3074F SYST BP LT 130 MM HG: CPT | Performed by: FAMILY MEDICINE

## 2023-08-03 PROCEDURE — 3078F DIAST BP <80 MM HG: CPT | Performed by: FAMILY MEDICINE

## 2023-08-03 PROCEDURE — 99214 OFFICE O/P EST MOD 30 MIN: CPT | Performed by: FAMILY MEDICINE

## 2023-08-03 RX ORDER — METFORMIN HYDROCHLORIDE 500 MG/1
1000 TABLET, EXTENDED RELEASE ORAL 2 TIMES DAILY
Qty: 120 TABLET | Refills: 2 | Status: SHIPPED | OUTPATIENT
Start: 2023-08-03

## 2023-08-03 NOTE — PROGRESS NOTES
Subjective:      Patient ID: Danielle Moreno is a 58 y.o. female. HPI     Diabetes Mellitus Type II, Follow-up: Patient here for follow-up of Type 2 diabetes mellitus. Current symptoms/problems include none and have been stable. Symptoms have been present for several years. Known diabetic complications: none  Cardiovascular risk factors: advanced age (older than 54 for men, 72 for women), diabetes mellitus, hypertension and obesity (BMI >= 30 kg/m2)  Current diabetic medications include  see med's list  .     Eye exam current (within one year): unknown  Weight trend: stable  Prior visit with dietician: no  Current diet: in general, an \"unhealthy\" diet  Current exercise: none    Current monitoring regimen: none  Home blood sugar records: patient does not test  Any episodes of hypoglycemia? no    Is She on ACE inhibitor or angiotensin II receptor blocker? Yes   lisinopril (generic)  She was on Ozempic dose and side effect at the cost was the big barrier she was paying 750 a month, all her numbers has improved she just back to try eating well exercise she quit drinking completely, she decided to be off the Ozempic stay on metformin thousand twice daily, monitor symptoms and blood glucose numbers  Hypertension: Patient here for follow-up of elevated blood pressure. She is exercising and is adherent to low salt diet. Blood pressure is well controlled at home. Cardiac symptoms none. Patient denies chest pain, chest pressure/discomfort, exertional chest pressure/discomfort, fatigue, lower extremity edema, near-syncope, orthopnea, palpitations, paroxysmal nocturnal dyspnea, syncope and tachypnea. Cardiovascular risk factors: diabetes mellitus and hypertension. Use of agents associated with hypertension: none. History of target organ damage: none. Hyperlipidemia: Patient presents with hyperlipidemia. She was tested because DM. Her last labs showed Total cholesterol see lab,. none.  There is a family history

## 2023-08-16 NOTE — TELEPHONE ENCOUNTER
Medication:   Requested Prescriptions     Pending Prescriptions Disp Refills    metFORMIN (GLUCOPHAGE-XR) 500 MG extended release tablet [Pharmacy Med Name: METFORMIN HCL  MG TABLET] 360 tablet      Sig: TAKE 2 TABLETS BY MOUTH IN THE MORNING AND AT BEDTIME       Last Filled:      Patient Phone Number: [phone] (home)     Last appt: 8/3/2023   Next appt: Visit date not found    Last Labs DM:   Lab Results   Component Value Date/Time    LABA1C 7.0 07/31/2023 08:30 AM       Last OARRS: No flowsheet data found. Preferred Pharmacy:   [address] [place] [address], [address] [place] [address]  Phone: [phone] Fax: [phone]  Medication:   Requested Prescriptions     Pending Prescriptions Disp Refills    metFORMIN (GLUCOPHAGE-XR) 500 MG extended release tablet [Pharmacy Med Name: METFORMIN HCL  MG TABLET] 360 tablet      Sig: TAKE 2 TABLETS BY MOUTH IN THE MORNING AND AT BEDTIME       Last Filled:      Patient Phone Number: [phone] (home)     Last appt: 8/3/2023   Next appt: Visit date not found    Last Labs DM:   Lab Results   Component Value Date/Time    LABA1C 7.0 07/31/2023 08:30 AM       Last OARRS: No flowsheet data found. Preferred Pharmacy:   [address] [place] [address], [address] [place] [address]   [place] [address]  Phone: [phone] Fax: [phone]

## 2023-08-17 RX ORDER — METFORMIN HYDROCHLORIDE 500 MG/1
1000 TABLET, EXTENDED RELEASE ORAL 2 TIMES DAILY
Qty: 360 TABLET | Refills: 0 | Status: SHIPPED | OUTPATIENT
Start: 2023-08-17

## 2023-08-20 LAB — DIABETIC RETINOPATHY: NEGATIVE

## 2023-08-22 RX ORDER — HYDROCHLOROTHIAZIDE 25 MG/1
TABLET ORAL
Qty: 90 TABLET | Refills: 0 | Status: SHIPPED | OUTPATIENT
Start: 2023-08-22

## 2023-08-22 NOTE — TELEPHONE ENCOUNTER
Medication:   Requested Prescriptions     Pending Prescriptions Disp Refills    hydroCHLOROthiazide (HYDRODIURIL) 25 MG tablet [Pharmacy Med Name: HYDROCHLOROTHIAZIDE 25 MG TAB] 90 tablet 0     Sig: TAKE 1 TABLET BY MOUTH EVERY DAY       Last Filled:      Patient Phone Number: [phone] (home)     Last appt: 8/3/2023   Next appt: Visit date not found    Last BMP:   Lab Results   Component Value Date/Time     07/31/2023 08:30 AM    K 4.0 07/31/2023 08:30 AM     07/31/2023 08:30 AM    CO2 26 07/31/2023 08:30 AM    ANIONGAP 10 07/31/2023 08:30 AM    GLUCOSE 131 07/31/2023 08:30 AM    BUN 10 07/31/2023 08:30 AM    CREATININE 0.7 07/31/2023 08:30 AM    LABGLOM >60 07/31/2023 08:30 AM    GFRAA >60 08/15/2022 12:31 PM    CALCIUM 9.8 07/31/2023 08:30 AM      Last CMP:   Lab Results   Component Value Date/Time     07/31/2023 08:30 AM    K 4.0 07/31/2023 08:30 AM     07/31/2023 08:30 AM    CO2 26 07/31/2023 08:30 AM    ANIONGAP 10 07/31/2023 08:30 AM    GLUCOSE 131 07/31/2023 08:30 AM    BUN 10 07/31/2023 08:30 AM    CREATININE 0.7 07/31/2023 08:30 AM    LABGLOM >60 07/31/2023 08:30 AM    GFRAA >60 08/15/2022 12:31 PM    PROT 8.3 03/09/2023 10:12 AM    LABALBU 4.5 03/09/2023 10:12 AM    AGRATIO 1.3 06/20/2018 11:06 AM    BILITOT 0.7 03/09/2023 10:12 AM    ALKPHOS 106 03/09/2023 10:12 AM    ALT 22 03/09/2023 10:12 AM    AST 23 03/09/2023 10:12 AM    GLOB 3.7 06/20/2018 11:06 AM     Last Renal Function:   Lab Results   Component Value Date/Time     07/31/2023 08:30 AM    K 4.0 07/31/2023 08:30 AM     07/31/2023 08:30 AM    CO2 26 07/31/2023 08:30 AM    GLUCOSE 131 07/31/2023 08:30 AM    BUN 10 07/31/2023 08:30 AM    CREATININE 0.7 07/31/2023 08:30 AM    LABALBU 4.5 03/09/2023 10:12 AM    CALCIUM 9.8 07/31/2023 08:30 AM    GFRAA >60 08/15/2022 12:31 PM       Last OARRS: No flowsheet data found.     Preferred Pharmacy:   [address] [place] [address], [address] [place] [address] Avenue [phone] - f

## 2023-09-05 ENCOUNTER — PATIENT MESSAGE (OUTPATIENT)
Dept: PRIMARY CARE CLINIC | Age: 63
End: 2023-09-05

## 2023-09-05 NOTE — TELEPHONE ENCOUNTER
From: Aye Pickens  To: Dr. Costello Romeo: 9/5/2023 10:05 AM EDT  Subject: Underarm rash    Demetri had a rash for about a month and tried to treat it with over the counter medicines and alcohol and peroxide. The issue is under both arms surrounding the armpit but the right is much worse and it itches. Can you suggest other treatments or do I need a prescription?

## 2023-09-07 ENCOUNTER — OFFICE VISIT (OUTPATIENT)
Dept: PRIMARY CARE CLINIC | Age: 63
End: 2023-09-07
Payer: COMMERCIAL

## 2023-09-07 VITALS
TEMPERATURE: 97.1 F | WEIGHT: 189 LBS | DIASTOLIC BLOOD PRESSURE: 70 MMHG | HEIGHT: 66 IN | OXYGEN SATURATION: 99 % | RESPIRATION RATE: 16 BRPM | HEART RATE: 100 BPM | BODY MASS INDEX: 30.37 KG/M2 | SYSTOLIC BLOOD PRESSURE: 120 MMHG

## 2023-09-07 DIAGNOSIS — L30.9 DERMATITIS: Primary | ICD-10-CM

## 2023-09-07 PROCEDURE — 3074F SYST BP LT 130 MM HG: CPT | Performed by: FAMILY MEDICINE

## 2023-09-07 PROCEDURE — 3078F DIAST BP <80 MM HG: CPT | Performed by: FAMILY MEDICINE

## 2023-09-07 PROCEDURE — 99213 OFFICE O/P EST LOW 20 MIN: CPT | Performed by: FAMILY MEDICINE

## 2023-09-07 RX ORDER — MOMETASONE FUROATE 1 MG/G
CREAM TOPICAL
Qty: 50 G | Refills: 0 | Status: SHIPPED | OUTPATIENT
Start: 2023-09-07

## 2023-09-07 ASSESSMENT — ENCOUNTER SYMPTOMS
EYES NEGATIVE: 1
RESPIRATORY NEGATIVE: 1
GASTROINTESTINAL NEGATIVE: 1

## 2023-09-13 NOTE — TELEPHONE ENCOUNTER
Medication:   Requested Prescriptions     Pending Prescriptions Disp Refills    amLODIPine (NORVASC) 10 MG tablet [Pharmacy Med Name: AMLODIPINE BESYLATE 10 MG TAB] 90 tablet 1     Sig: TAKE 1 TABLET BY MOUTH EVERY DAY    atorvastatin (LIPITOR) 10 MG tablet [Pharmacy Med Name: ATORVASTATIN 10 MG TABLET] 90 tablet 1     Sig: TAKE 1 TABLET BY MOUTH EVERY DAY     Last Filled:  03/16/2023    Last appt: 9/7/2023   Next appt: Visit date not found    Last Lipid:   Lab Results   Component Value Date/Time    CHOL 128 03/09/2023 10:12 AM    TRIG 67 03/09/2023 10:12 AM    HDL 59 03/09/2023 10:12 AM    LDLCALC 56 03/09/2023 10:12 AM

## 2023-09-14 RX ORDER — ATORVASTATIN CALCIUM 10 MG/1
TABLET, FILM COATED ORAL
Qty: 90 TABLET | Refills: 1 | Status: SHIPPED | OUTPATIENT
Start: 2023-09-14

## 2023-09-14 RX ORDER — AMLODIPINE BESYLATE 10 MG/1
TABLET ORAL
Qty: 90 TABLET | Refills: 1 | Status: SHIPPED | OUTPATIENT
Start: 2023-09-14

## 2023-11-27 RX ORDER — HYDROCHLOROTHIAZIDE 25 MG/1
TABLET ORAL
Qty: 90 TABLET | Refills: 0 | Status: SHIPPED | OUTPATIENT
Start: 2023-11-27

## 2023-11-27 NOTE — TELEPHONE ENCOUNTER
Medication:   Requested Prescriptions     Pending Prescriptions Disp Refills    hydroCHLOROthiazide (HYDRODIURIL) 25 MG tablet [Pharmacy Med Name: HYDROCHLOROTHIAZIDE 25 MG TAB] 90 tablet 0     Sig: TAKE 1 TABLET BY MOUTH EVERY DAY     Last filled: 8.22.23  Last appt: 9/7/2023   Next appt: Visit date not found    Last OARRS:        No data to display

## 2024-01-11 ENCOUNTER — HOSPITAL ENCOUNTER (OUTPATIENT)
Dept: WOMENS IMAGING | Age: 64
Discharge: HOME | End: 2024-01-11
Payer: COMMERCIAL

## 2024-01-11 VITALS — BODY MASS INDEX: 29.57 KG/M2 | WEIGHT: 184 LBS | HEIGHT: 66 IN

## 2024-01-11 DIAGNOSIS — Z12.31 VISIT FOR SCREENING MAMMOGRAM: ICD-10-CM

## 2024-01-11 PROCEDURE — 77063 BREAST TOMOSYNTHESIS BI: CPT

## 2024-01-25 ENCOUNTER — TELEMEDICINE (OUTPATIENT)
Dept: PRIMARY CARE CLINIC | Age: 64
End: 2024-01-25
Payer: COMMERCIAL

## 2024-01-25 DIAGNOSIS — U07.1 COVID: Primary | ICD-10-CM

## 2024-01-25 PROCEDURE — 99213 OFFICE O/P EST LOW 20 MIN: CPT | Performed by: FAMILY MEDICINE

## 2024-01-25 RX ORDER — NIRMATRELVIR AND RITONAVIR 150-100 MG
KIT ORAL
Qty: 20 TABLET | Refills: 0 | Status: SHIPPED | OUTPATIENT
Start: 2024-01-25 | End: 2024-01-30

## 2024-01-25 ASSESSMENT — ENCOUNTER SYMPTOMS
EYES NEGATIVE: 1
COUGH: 1
GASTROINTESTINAL NEGATIVE: 1

## 2024-01-25 NOTE — PROGRESS NOTES
2024    TELEHEALTH EVALUATION -- Audio/Visual    HPI:    Pamela J Brailsford (:  1960) has requested an audio/video evaluation for the following concern(s):    Upper Respiratory Infection: Patient complains of symptoms of a URI. Symptoms include congestion, coryza, cough, and sore throat. Onset of symptoms was 4 days ago, gradually worsening since that time. She also c/o achiness, congestion, no  fever, and non productive cough for the past 4 days .  She is drinking moderate amounts of fluids. Evaluation to date: none. Treatment to date: oral decongestant, Acetaminophen.        Review of Systems   Constitutional:  Positive for fatigue. Negative for chills.   HENT:  Positive for congestion.    Eyes: Negative.    Respiratory:  Positive for cough.    Cardiovascular: Negative.    Gastrointestinal: Negative.        Prior to Visit Medications    Medication Sig Taking? Authorizing Provider   hydroCHLOROthiazide (HYDRODIURIL) 25 MG tablet TAKE 1 TABLET BY MOUTH EVERY DAY  Christie Mehta MD   amLODIPine (NORVASC) 10 MG tablet TAKE 1 TABLET BY MOUTH EVERY DAY  Christie Mehta MD   atorvastatin (LIPITOR) 10 MG tablet TAKE 1 TABLET BY MOUTH EVERY DAY  Christie Mehta MD   mometasone (ELOCON) 0.1 % cream Apply topically daily.  Christie Mehta MD   metFORMIN (GLUCOPHAGE-XR) 500 MG extended release tablet TAKE 2 TABLETS BY MOUTH IN THE MORNING AND AT BEDTIME  Christie Mehta MD   potassium chloride 8 MEQ CPCR TAKE 1 CAPSULE BY MOUTH TWICE A DAY  Jessica Farrar MD   EPINEPHrine (EPIPEN 2-CINTHYA) 0.3 MG/0.3ML SOAJ injection Inject 0.3 mLs into the skin once for 1 dose Use as directed for allergic reaction  Christie Mehta MD   Probiotic Product (PROBIOTIC DAILY PO) Take by mouth  ProviderClyde MD   ONE TOUCH LANCETS MISC 1 each by Does not apply route daily  Christie Mehta MD   Blood Glucose Monitoring Suppl (ONE TOUCH BASIC SYSTEM) W/DEVICE KIT 1 kit by Does not apply route daily as needed (does not apply)  Christie Mehta MD   glucose

## 2024-01-29 RX ORDER — POTASSIUM CHLORIDE 600 MG/1
CAPSULE, EXTENDED RELEASE ORAL
Qty: 180 CAPSULE | Refills: 0 | Status: SHIPPED | OUTPATIENT
Start: 2024-01-29

## 2024-01-29 NOTE — TELEPHONE ENCOUNTER
Medication:   Requested Prescriptions     Pending Prescriptions Disp Refills    potassium chloride 8 MEQ CPCR [Pharmacy Med Name: POTASSIUM CL ER 8 MEQ CAPSULE] 180 capsule 0     Sig: TAKE 1 CAPSULE BY MOUTH TWICE A DAY     Last Filled:  5.19.23    Last appt: 1/25/2024   Next appt: 2/20/2024    Last OARRS:        No data to display              
Applied

## 2024-02-15 DIAGNOSIS — E11.9 TYPE 2 DIABETES MELLITUS WITHOUT COMPLICATION, WITHOUT LONG-TERM CURRENT USE OF INSULIN (HCC): Primary | ICD-10-CM

## 2024-02-19 DIAGNOSIS — E11.9 TYPE 2 DIABETES MELLITUS WITHOUT COMPLICATION, WITHOUT LONG-TERM CURRENT USE OF INSULIN (HCC): ICD-10-CM

## 2024-02-19 LAB
ALBUMIN SERPL-MCNC: 4.5 G/DL (ref 3.4–5)
ALP SERPL-CCNC: 88 U/L (ref 40–129)
ALT SERPL-CCNC: 16 U/L (ref 10–40)
ANION GAP SERPL CALCULATED.3IONS-SCNC: 10 MMOL/L (ref 3–16)
AST SERPL-CCNC: 24 U/L (ref 15–37)
BASOPHILS # BLD: 0 K/UL (ref 0–0.2)
BASOPHILS NFR BLD: 0.9 %
BILIRUB DIRECT SERPL-MCNC: <0.2 MG/DL (ref 0–0.3)
BILIRUB INDIRECT SERPL-MCNC: NORMAL MG/DL (ref 0–1)
BILIRUB SERPL-MCNC: 0.6 MG/DL (ref 0–1)
BUN SERPL-MCNC: 12 MG/DL (ref 7–20)
CALCIUM SERPL-MCNC: 9.8 MG/DL (ref 8.3–10.6)
CHLORIDE SERPL-SCNC: 107 MMOL/L (ref 99–110)
CHOLEST SERPL-MCNC: 123 MG/DL (ref 0–199)
CO2 SERPL-SCNC: 28 MMOL/L (ref 21–32)
CREAT SERPL-MCNC: 0.7 MG/DL (ref 0.6–1.2)
DEPRECATED RDW RBC AUTO: 13 % (ref 12.4–15.4)
EOSINOPHIL # BLD: 0 K/UL (ref 0–0.6)
EOSINOPHIL NFR BLD: 1.1 %
FOLATE SERPL-MCNC: 12.15 NG/ML (ref 4.78–24.2)
GFR SERPLBLD CREATININE-BSD FMLA CKD-EPI: >60 ML/MIN/{1.73_M2}
GLUCOSE SERPL-MCNC: 105 MG/DL (ref 70–99)
HCT VFR BLD AUTO: 40.4 % (ref 36–48)
HDLC SERPL-MCNC: 60 MG/DL (ref 40–60)
HGB BLD-MCNC: 13.4 G/DL (ref 12–16)
LDLC SERPL CALC-MCNC: 53 MG/DL
LYMPHOCYTES # BLD: 1.1 K/UL (ref 1–5.1)
LYMPHOCYTES NFR BLD: 25.3 %
MCH RBC QN AUTO: 29.5 PG (ref 26–34)
MCHC RBC AUTO-ENTMCNC: 33.3 G/DL (ref 31–36)
MCV RBC AUTO: 88.8 FL (ref 80–100)
MONOCYTES # BLD: 0.3 K/UL (ref 0–1.3)
MONOCYTES NFR BLD: 8.1 %
NEUTROPHILS # BLD: 2.7 K/UL (ref 1.7–7.7)
NEUTROPHILS NFR BLD: 64.6 %
PLATELET # BLD AUTO: 227 K/UL (ref 135–450)
PMV BLD AUTO: 9.3 FL (ref 5–10.5)
POTASSIUM SERPL-SCNC: 4.2 MMOL/L (ref 3.5–5.1)
PROT SERPL-MCNC: 7.6 G/DL (ref 6.4–8.2)
RBC # BLD AUTO: 4.55 M/UL (ref 4–5.2)
SODIUM SERPL-SCNC: 145 MMOL/L (ref 136–145)
TRIGL SERPL-MCNC: 48 MG/DL (ref 0–150)
TSH SERPL DL<=0.005 MIU/L-ACNC: 1.84 UIU/ML (ref 0.27–4.2)
VIT B12 SERPL-MCNC: 920 PG/ML (ref 211–911)
VLDLC SERPL CALC-MCNC: 10 MG/DL
WBC # BLD AUTO: 4.2 K/UL (ref 4–11)

## 2024-02-20 ENCOUNTER — OFFICE VISIT (OUTPATIENT)
Dept: PRIMARY CARE CLINIC | Age: 64
End: 2024-02-20
Payer: COMMERCIAL

## 2024-02-20 VITALS
DIASTOLIC BLOOD PRESSURE: 72 MMHG | OXYGEN SATURATION: 99 % | SYSTOLIC BLOOD PRESSURE: 124 MMHG | TEMPERATURE: 97.3 F | BODY MASS INDEX: 27.61 KG/M2 | HEART RATE: 86 BPM | WEIGHT: 171.05 LBS

## 2024-02-20 DIAGNOSIS — I10 HYPERTENSION, BENIGN: ICD-10-CM

## 2024-02-20 DIAGNOSIS — E11.9 TYPE 2 DIABETES MELLITUS WITHOUT COMPLICATION, WITHOUT LONG-TERM CURRENT USE OF INSULIN (HCC): Primary | ICD-10-CM

## 2024-02-20 DIAGNOSIS — E78.2 MIXED HYPERLIPIDEMIA: ICD-10-CM

## 2024-02-20 DIAGNOSIS — G62.9 NEUROPATHY: ICD-10-CM

## 2024-02-20 LAB
EST. AVERAGE GLUCOSE BLD GHB EST-MCNC: 168.6 MG/DL
HBA1C MFR BLD: 7.5 %

## 2024-02-20 PROCEDURE — 3051F HG A1C>EQUAL 7.0%<8.0%: CPT | Performed by: FAMILY MEDICINE

## 2024-02-20 PROCEDURE — 3078F DIAST BP <80 MM HG: CPT | Performed by: FAMILY MEDICINE

## 2024-02-20 PROCEDURE — 99214 OFFICE O/P EST MOD 30 MIN: CPT | Performed by: FAMILY MEDICINE

## 2024-02-20 PROCEDURE — 3074F SYST BP LT 130 MM HG: CPT | Performed by: FAMILY MEDICINE

## 2024-02-20 ASSESSMENT — PATIENT HEALTH QUESTIONNAIRE - PHQ9
7. TROUBLE CONCENTRATING ON THINGS, SUCH AS READING THE NEWSPAPER OR WATCHING TELEVISION: 0
SUM OF ALL RESPONSES TO PHQ QUESTIONS 1-9: 0
1. LITTLE INTEREST OR PLEASURE IN DOING THINGS: 0
8. MOVING OR SPEAKING SO SLOWLY THAT OTHER PEOPLE COULD HAVE NOTICED. OR THE OPPOSITE, BEING SO FIGETY OR RESTLESS THAT YOU HAVE BEEN MOVING AROUND A LOT MORE THAN USUAL: 0
2. FEELING DOWN, DEPRESSED OR HOPELESS: 0
SUM OF ALL RESPONSES TO PHQ9 QUESTIONS 1 & 2: 0
9. THOUGHTS THAT YOU WOULD BE BETTER OFF DEAD, OR OF HURTING YOURSELF: 0
10. IF YOU CHECKED OFF ANY PROBLEMS, HOW DIFFICULT HAVE THESE PROBLEMS MADE IT FOR YOU TO DO YOUR WORK, TAKE CARE OF THINGS AT HOME, OR GET ALONG WITH OTHER PEOPLE: 0
SUM OF ALL RESPONSES TO PHQ QUESTIONS 1-9: 0
3. TROUBLE FALLING OR STAYING ASLEEP: 0
SUM OF ALL RESPONSES TO PHQ QUESTIONS 1-9: 0
4. FEELING TIRED OR HAVING LITTLE ENERGY: 0
6. FEELING BAD ABOUT YOURSELF - OR THAT YOU ARE A FAILURE OR HAVE LET YOURSELF OR YOUR FAMILY DOWN: 0
SUM OF ALL RESPONSES TO PHQ QUESTIONS 1-9: 0
5. POOR APPETITE OR OVEREATING: 0

## 2024-02-20 NOTE — PROGRESS NOTES
Subjective:      Patient ID: Pamela J Brailsford is a 63 y.o. female.    HPI     Diabetes Mellitus Type II, Follow-up: Patient here for follow-up of Type 2 diabetes mellitus.  Current symptoms/problems include none and have been stable. Symptoms have been present for several years.    Known diabetic complications: none  Cardiovascular risk factors: advanced age (older than 55 for men, 65 for women), diabetes mellitus, hypertension and obesity (BMI >= 30 kg/m2)  Current diabetic medications include  see med's list  .     Eye exam current (within one year): unknown  Weight trend: stable  Prior visit with dietician: no  Current diet: in general, an \"unhealthy\" diet  Current exercise: none    Current monitoring regimen: none  Home blood sugar records: patient does not test  Any episodes of hypoglycemia? no    Is She on ACE inhibitor or angiotensin II receptor blocker?   Yes   lisinopril (generic)  She is trying to eat healthier and exercise she feels a lot better she is avoiding simple sugar  Concerned about tingling numbness in the left leg been there for quite some time no direct trauma or injury it comes and goes since subspecialist not much help, she does not recall having nerve study done.  Hypertension: Patient here for follow-up of elevated blood pressure. She is exercising and is adherent to low salt diet.  Blood pressure is well controlled at home. Cardiac symptoms none. Patient denies chest pain, chest pressure/discomfort, exertional chest pressure/discomfort, fatigue, lower extremity edema, near-syncope, orthopnea, palpitations, paroxysmal nocturnal dyspnea, syncope and tachypnea.  Cardiovascular risk factors: diabetes mellitus and hypertension. Use of agents associated with hypertension: none. History of target organ damage: none.  Hyperlipidemia: Patient presents with hyperlipidemia.  She was tested because DM.  Her last labs showed Total cholesterol see lab,. none. There is a family history of

## 2024-03-07 RX ORDER — HYDROCHLOROTHIAZIDE 25 MG/1
TABLET ORAL
Qty: 90 TABLET | Refills: 0 | Status: SHIPPED | OUTPATIENT
Start: 2024-03-07

## 2024-03-07 RX ORDER — METFORMIN HYDROCHLORIDE 500 MG/1
1000 TABLET, EXTENDED RELEASE ORAL 2 TIMES DAILY
Qty: 360 TABLET | Refills: 0 | Status: SHIPPED | OUTPATIENT
Start: 2024-03-07

## 2024-03-07 NOTE — TELEPHONE ENCOUNTER
Medication:   Requested Prescriptions     Pending Prescriptions Disp Refills    metFORMIN (GLUCOPHAGE-XR) 500 MG extended release tablet [Pharmacy Med Name: METFORMIN HCL  MG TABLET] 360 tablet 0     Sig: TAKE 2 TABLETS BY MOUTH IN THE MORNING AND AT BEDTIME    hydroCHLOROthiazide (HYDRODIURIL) 25 MG tablet [Pharmacy Med Name: HYDROCHLOROTHIAZIDE 25 MG TAB] 90 tablet 0     Sig: TAKE 1 TABLET BY MOUTH EVERY DAY     Last Filled:  11/27/2023, 08/17/2023    Last appt: 2/20/2024   Next appt: Visit date not found    Last OARRS:        No data to display

## 2024-03-26 DIAGNOSIS — G62.9 NEUROPATHY: Primary | ICD-10-CM

## 2024-05-01 NOTE — TELEPHONE ENCOUNTER
Medication:   Requested Prescriptions     Pending Prescriptions Disp Refills    potassium chloride 8 MEQ CPCR [Pharmacy Med Name: POTASSIUM CL ER 8 MEQ CAPSULE] 180 capsule 0     Sig: TAKE 1 CAPSULE BY MOUTH TWICE A DAY     Last Filled:  1.29.24    Last appt: 2/20/2024   Next appt: Visit date not found    Last OARRS:        No data to display

## 2024-05-02 RX ORDER — POTASSIUM CHLORIDE 600 MG/1
CAPSULE, EXTENDED RELEASE ORAL
Qty: 180 CAPSULE | Refills: 0 | Status: SHIPPED | OUTPATIENT
Start: 2024-05-02

## 2024-06-21 RX ORDER — METFORMIN HYDROCHLORIDE 500 MG/1
1000 TABLET, EXTENDED RELEASE ORAL 2 TIMES DAILY
Qty: 360 TABLET | Refills: 0 | Status: SHIPPED | OUTPATIENT
Start: 2024-06-21

## 2024-06-21 NOTE — TELEPHONE ENCOUNTER
Medication:   Requested Prescriptions     Pending Prescriptions Disp Refills    metFORMIN (GLUCOPHAGE-XR) 500 MG extended release tablet [Pharmacy Med Name: METFORMIN HCL  MG TABLET] 360 tablet 0     Sig: TAKE 2 TABLETS BY MOUTH IN THE MORNING AND AT BEDTIME     Last Filled:  3/7/24    Last appt: 2/20/2024   Next appt: Visit date not found    Last Labs DM:   Lab Results   Component Value Date/Time    LABA1C 7.5 02/19/2024 09:29 AM     
assisted living

## 2024-08-05 RX ORDER — POTASSIUM CHLORIDE 600 MG/1
CAPSULE, EXTENDED RELEASE ORAL
Qty: 180 CAPSULE | Refills: 0 | Status: SHIPPED | OUTPATIENT
Start: 2024-08-05

## 2024-08-05 NOTE — TELEPHONE ENCOUNTER
Medication:   Requested Prescriptions     Pending Prescriptions Disp Refills    potassium chloride 8 MEQ CPCR [Pharmacy Med Name: POTASSIUM CL ER 8 MEQ CAPSULE] 180 capsule 0     Sig: TAKE 1 CAPSULE BY MOUTH TWICE A DAY     Last Filled:  5.2.24    Last appt: 2/20/2024   Next appt: 8.19.24    Last OARRS:        No data to display

## 2024-08-13 LAB — DIABETIC RETINOPATHY: NEGATIVE

## 2024-08-26 RX ORDER — EPINEPHRINE 0.3 MG/.3ML
0.3 INJECTION SUBCUTANEOUS ONCE
Qty: 0.3 ML | Refills: 1 | Status: SHIPPED | OUTPATIENT
Start: 2024-08-26 | End: 2024-08-26

## 2024-08-26 NOTE — TELEPHONE ENCOUNTER
Medication:   Requested Prescriptions     Pending Prescriptions Disp Refills    EPINEPHrine (EPIPEN 2-CINTHYA) 0.3 MG/0.3ML SOAJ injection 0.3 mL 1     Sig: Inject 0.3 mLs into the skin once for 1 dose Use as directed for allergic reaction     Last Filled:  4/27/23    Last appt: 2/20/2024   Next appt: Patient states she will call once she gets home to schedule OV    Last OARRS:        No data to display

## 2024-09-20 RX ORDER — METFORMIN HCL 500 MG
1000 TABLET, EXTENDED RELEASE 24 HR ORAL 2 TIMES DAILY
Qty: 120 TABLET | Refills: 0 | Status: SHIPPED | OUTPATIENT
Start: 2024-09-20

## 2024-10-07 RX ORDER — AMLODIPINE BESYLATE 10 MG/1
TABLET ORAL
Qty: 30 TABLET | Refills: 0 | Status: SHIPPED | OUTPATIENT
Start: 2024-10-07

## 2024-10-07 RX ORDER — ATORVASTATIN CALCIUM 10 MG/1
TABLET, FILM COATED ORAL
Qty: 30 TABLET | Refills: 0 | Status: SHIPPED | OUTPATIENT
Start: 2024-10-07

## 2024-10-07 NOTE — TELEPHONE ENCOUNTER
Medication:   Requested Prescriptions     Pending Prescriptions Disp Refills    amLODIPine (NORVASC) 10 MG tablet [Pharmacy Med Name: AMLODIPINE BESYLATE 10 MG TAB] 90 tablet 1     Sig: TAKE 1 TABLET BY MOUTH EVERY DAY    atorvastatin (LIPITOR) 10 MG tablet [Pharmacy Med Name: ATORVASTATIN 10 MG TABLET] 90 tablet 1     Sig: TAKE 1 TABLET BY MOUTH EVERY DAY     Last Filled:  9.14.23    Last appt: 2/20/2024   Next appt: 10/24/2024    Last Lipid:   Lab Results   Component Value Date/Time    CHOL 123 02/19/2024 09:29 AM    TRIG 48 02/19/2024 09:29 AM    HDL 60 02/19/2024 09:29 AM

## 2024-10-14 RX ORDER — METFORMIN HCL 500 MG
1000 TABLET, EXTENDED RELEASE 24 HR ORAL 2 TIMES DAILY
Qty: 120 TABLET | Refills: 0 | Status: SHIPPED | OUTPATIENT
Start: 2024-10-14

## 2024-10-14 NOTE — TELEPHONE ENCOUNTER
Patient overdue for appointment she is scheduled for 11-14 24. A 30 day supply sent to get through to appointment.

## 2024-10-14 NOTE — TELEPHONE ENCOUNTER
Medication:   Requested Prescriptions     Pending Prescriptions Disp Refills    metFORMIN (GLUCOPHAGE-XR) 500 MG extended release tablet [Pharmacy Med Name: METFORMIN HCL  MG TABLET] 360 tablet 1     Sig: TAKE 2 TABLETS BY MOUTH IN THE MORNING AND AT BEDTIME     Last Filled:  09/20/2024    Last appt: 2/20/2024   Next appt: 11/19/2024    Last OARRS:        No data to display

## 2024-10-30 RX ORDER — ATORVASTATIN CALCIUM 10 MG/1
TABLET, FILM COATED ORAL
Qty: 90 TABLET | Refills: 1 | Status: SHIPPED | OUTPATIENT
Start: 2024-10-30

## 2024-10-30 RX ORDER — AMLODIPINE BESYLATE 10 MG/1
TABLET ORAL
Qty: 90 TABLET | Refills: 0 | Status: SHIPPED | OUTPATIENT
Start: 2024-10-30

## 2024-10-30 NOTE — TELEPHONE ENCOUNTER
Medication:   Requested Prescriptions     Pending Prescriptions Disp Refills    amLODIPine (NORVASC) 10 MG tablet [Pharmacy Med Name: AMLODIPINE BESYLATE 10 MG TAB] 90 tablet 1     Sig: TAKE 1 TABLET BY MOUTH EVERY DAY     Last Filled:  10/7/2024    Last appt: 2/20/2024   Next appt: 11/19/2024    Last OARRS:        No data to display

## 2024-10-30 NOTE — TELEPHONE ENCOUNTER
Medication:   Requested Prescriptions     Pending Prescriptions Disp Refills    atorvastatin (LIPITOR) 10 MG tablet [Pharmacy Med Name: ATORVASTATIN 10 MG TABLET] 90 tablet 1     Sig: TAKE 1 TABLET BY MOUTH EVERY DAY     Last Filled:  10.7.24    Last appt: 2/20/2024   Next appt: 11/19/2024    Last Lipid:   Lab Results   Component Value Date/Time    CHOL 123 02/19/2024 09:29 AM    TRIG 48 02/19/2024 09:29 AM    HDL 60 02/19/2024 09:29 AM

## 2024-11-01 RX ORDER — POTASSIUM CHLORIDE 600 MG/1
CAPSULE, EXTENDED RELEASE ORAL
Qty: 180 CAPSULE | Refills: 0 | Status: SHIPPED | OUTPATIENT
Start: 2024-11-01

## 2024-11-01 NOTE — TELEPHONE ENCOUNTER
Medication:   Requested Prescriptions     Pending Prescriptions Disp Refills    potassium chloride 8 MEQ CPCR [Pharmacy Med Name: POTASSIUM CL ER 8 MEQ CAPSULE] 180 capsule 0     Sig: TAKE 1 CAPSULE BY MOUTH TWICE A DAY     Last Filled:  8.5.24    Last appt: 2/20/2024   Next appt: 11/19/2024    Last OARRS:        No data to display

## 2024-11-07 RX ORDER — METFORMIN HYDROCHLORIDE 500 MG/1
1000 TABLET, EXTENDED RELEASE ORAL 2 TIMES DAILY
Qty: 360 TABLET | Refills: 0 | Status: SHIPPED | OUTPATIENT
Start: 2024-11-07

## 2024-11-07 NOTE — TELEPHONE ENCOUNTER
Medication:   Requested Prescriptions     Pending Prescriptions Disp Refills    metFORMIN (GLUCOPHAGE-XR) 500 MG extended release tablet [Pharmacy Med Name: METFORMIN HCL  MG TABLET] 360 tablet 1     Sig: TAKE 2 TABLETS BY MOUTH IN THE MORNING AND AT BEDTIME     Last Filled:  10/14/2024    Last appt: 2/20/2024   Next appt: 11/19/2024    Last Labs DM:   Lab Results   Component Value Date/Time    LABA1C 7.5 02/19/2024 09:29 AM

## 2024-11-16 SDOH — ECONOMIC STABILITY: INCOME INSECURITY: HOW HARD IS IT FOR YOU TO PAY FOR THE VERY BASICS LIKE FOOD, HOUSING, MEDICAL CARE, AND HEATING?: NOT HARD AT ALL

## 2024-11-16 SDOH — ECONOMIC STABILITY: FOOD INSECURITY: WITHIN THE PAST 12 MONTHS, THE FOOD YOU BOUGHT JUST DIDN'T LAST AND YOU DIDN'T HAVE MONEY TO GET MORE.: NEVER TRUE

## 2024-11-16 SDOH — ECONOMIC STABILITY: FOOD INSECURITY: WITHIN THE PAST 12 MONTHS, YOU WORRIED THAT YOUR FOOD WOULD RUN OUT BEFORE YOU GOT MONEY TO BUY MORE.: NEVER TRUE

## 2024-11-18 DIAGNOSIS — E11.9 TYPE 2 DIABETES MELLITUS WITHOUT COMPLICATION, WITHOUT LONG-TERM CURRENT USE OF INSULIN (HCC): Primary | ICD-10-CM

## 2024-11-19 ENCOUNTER — OFFICE VISIT (OUTPATIENT)
Dept: PRIMARY CARE CLINIC | Age: 64
End: 2024-11-19
Payer: MEDICARE

## 2024-11-19 VITALS
HEART RATE: 88 BPM | OXYGEN SATURATION: 98 % | WEIGHT: 187.4 LBS | DIASTOLIC BLOOD PRESSURE: 60 MMHG | BODY MASS INDEX: 30.12 KG/M2 | HEIGHT: 66 IN | SYSTOLIC BLOOD PRESSURE: 120 MMHG

## 2024-11-19 DIAGNOSIS — Z78.0 POST-MENOPAUSAL: ICD-10-CM

## 2024-11-19 DIAGNOSIS — E11.9 TYPE 2 DIABETES MELLITUS WITHOUT COMPLICATION, WITHOUT LONG-TERM CURRENT USE OF INSULIN (HCC): Primary | ICD-10-CM

## 2024-11-19 DIAGNOSIS — I10 HYPERTENSION, BENIGN: ICD-10-CM

## 2024-11-19 DIAGNOSIS — E11.9 TYPE 2 DIABETES MELLITUS WITHOUT COMPLICATION, WITHOUT LONG-TERM CURRENT USE OF INSULIN (HCC): ICD-10-CM

## 2024-11-19 DIAGNOSIS — E78.2 MIXED HYPERLIPIDEMIA: ICD-10-CM

## 2024-11-19 LAB
ALBUMIN SERPL-MCNC: 4.1 G/DL (ref 3.4–5)
ALBUMIN/GLOB SERPL: 1.5 {RATIO} (ref 1.1–2.2)
ALP SERPL-CCNC: 93 U/L (ref 40–129)
ALT SERPL-CCNC: 14 U/L (ref 10–40)
ANION GAP SERPL CALCULATED.3IONS-SCNC: 11 MMOL/L (ref 3–16)
AST SERPL-CCNC: 21 U/L (ref 15–37)
BASOPHILS # BLD: 0 K/UL (ref 0–0.2)
BASOPHILS NFR BLD: 0.7 %
BILIRUB SERPL-MCNC: 0.5 MG/DL (ref 0–1)
BUN SERPL-MCNC: 10 MG/DL (ref 7–20)
CALCIUM SERPL-MCNC: 9.4 MG/DL (ref 8.3–10.6)
CHLORIDE SERPL-SCNC: 106 MMOL/L (ref 99–110)
CHOLEST SERPL-MCNC: 122 MG/DL (ref 0–199)
CO2 SERPL-SCNC: 24 MMOL/L (ref 21–32)
CREAT SERPL-MCNC: 0.7 MG/DL (ref 0.6–1.2)
DEPRECATED RDW RBC AUTO: 12.9 % (ref 12.4–15.4)
EOSINOPHIL # BLD: 0 K/UL (ref 0–0.6)
EOSINOPHIL NFR BLD: 0.6 %
EST. AVERAGE GLUCOSE BLD GHB EST-MCNC: 205.9 MG/DL
FOLATE SERPL-MCNC: 8.14 NG/ML (ref 4.78–24.2)
GFR SERPLBLD CREATININE-BSD FMLA CKD-EPI: >90 ML/MIN/{1.73_M2}
GLUCOSE SERPL-MCNC: 168 MG/DL (ref 70–99)
HBA1C MFR BLD: 8.8 %
HCT VFR BLD AUTO: 39.2 % (ref 36–48)
HDLC SERPL-MCNC: 55 MG/DL (ref 40–60)
HGB BLD-MCNC: 13 G/DL (ref 12–16)
LDLC SERPL CALC-MCNC: 56 MG/DL
LYMPHOCYTES # BLD: 1.2 K/UL (ref 1–5.1)
LYMPHOCYTES NFR BLD: 19.4 %
MCH RBC QN AUTO: 29.6 PG (ref 26–34)
MCHC RBC AUTO-ENTMCNC: 33.2 G/DL (ref 31–36)
MCV RBC AUTO: 88.9 FL (ref 80–100)
MONOCYTES # BLD: 0.4 K/UL (ref 0–1.3)
MONOCYTES NFR BLD: 6.2 %
NEUTROPHILS # BLD: 4.4 K/UL (ref 1.7–7.7)
NEUTROPHILS NFR BLD: 73.1 %
PLATELET # BLD AUTO: 223 K/UL (ref 135–450)
PMV BLD AUTO: 10 FL (ref 5–10.5)
POTASSIUM SERPL-SCNC: 3.9 MMOL/L (ref 3.5–5.1)
PROT SERPL-MCNC: 6.9 G/DL (ref 6.4–8.2)
RBC # BLD AUTO: 4.41 M/UL (ref 4–5.2)
SODIUM SERPL-SCNC: 141 MMOL/L (ref 136–145)
TRIGL SERPL-MCNC: 53 MG/DL (ref 0–150)
TSH SERPL DL<=0.005 MIU/L-ACNC: 1.66 UIU/ML (ref 0.27–4.2)
VIT B12 SERPL-MCNC: 649 PG/ML (ref 211–911)
VLDLC SERPL CALC-MCNC: 11 MG/DL
WBC # BLD AUTO: 6 K/UL (ref 4–11)

## 2024-11-19 PROCEDURE — 3078F DIAST BP <80 MM HG: CPT | Performed by: FAMILY MEDICINE

## 2024-11-19 PROCEDURE — 3051F HG A1C>EQUAL 7.0%<8.0%: CPT | Performed by: FAMILY MEDICINE

## 2024-11-19 PROCEDURE — 1123F ACP DISCUSS/DSCN MKR DOCD: CPT | Performed by: FAMILY MEDICINE

## 2024-11-19 PROCEDURE — 3074F SYST BP LT 130 MM HG: CPT | Performed by: FAMILY MEDICINE

## 2024-11-19 PROCEDURE — 99214 OFFICE O/P EST MOD 30 MIN: CPT | Performed by: FAMILY MEDICINE

## 2024-11-19 NOTE — PROGRESS NOTES
Subjective:      Patient ID: Pamela J Brailsford is a 64 y.o. female.    HPI     Diabetes Mellitus Type II, Follow-up: Patient here for follow-up of Type 2 diabetes mellitus.  Current symptoms/problems include none and have been stable. Symptoms have been present for several years.    Known diabetic complications: none  Cardiovascular risk factors: advanced age (older than 55 for men, 65 for women), diabetes mellitus, hypertension and obesity (BMI >= 30 kg/m2)  Current diabetic medications include  see med's list  .     Eye exam current (within one year): unknown  Weight trend: stable  Prior visit with dietician: no  Current diet: in general, an \"unhealthy\" diet  Current exercise: none    Current monitoring regimen: none  Home blood sugar records: patient does not test  Any episodes of hypoglycemia? no    Is She on ACE inhibitor or angiotensin II receptor blocker?   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 She is exercising and is adherent to low salt diet.  Blood pressure is well controlled at home. Cardiac symptoms none. Patient denies chest pain, chest pressure/discomfort, exertional chest pressure/discomfort, fatigue, lower extremity edema, near-syncope, orthopnea, palpitations, paroxysmal nocturnal dyspnea, syncope and tachypnea.  Cardiovascular risk factors: diabetes mellitus and hypertension. Use of agents associated with hypertension: none. History of target organ damage: none.  Hyperlipidemia: Patient presents with hyperlipidemia.  She was tested because DM.  Her last labs showed Total cholesterol see lab,. none. There is a family history of hyperlipidemia. There is a family history of early ischemia heart

## 2024-12-18 ENCOUNTER — PATIENT MESSAGE (OUTPATIENT)
Dept: PRIMARY CARE CLINIC | Age: 64
End: 2024-12-18

## 2024-12-23 RX ORDER — HYDROCHLOROTHIAZIDE 25 MG/1
TABLET ORAL
Qty: 90 TABLET | Refills: 0 | Status: SHIPPED | OUTPATIENT
Start: 2024-12-23

## 2024-12-23 NOTE — TELEPHONE ENCOUNTER
Medication:   Requested Prescriptions     Pending Prescriptions Disp Refills    hydroCHLOROthiazide (HYDRODIURIL) 25 MG tablet [Pharmacy Med Name: HYDROCHLOROTHIAZIDE 25 MG TAB] 90 tablet 0     Sig: TAKE 1 TABLET BY MOUTH EVERY DAY     Last Filled:  3.7.24    Last appt: 11/19/2024   Next appt: Visit date not found    Last OARRS:        No data to display

## 2025-01-10 DIAGNOSIS — E11.9 TYPE 2 DIABETES MELLITUS WITHOUT COMPLICATION, WITHOUT LONG-TERM CURRENT USE OF INSULIN (HCC): ICD-10-CM

## 2025-01-11 LAB
ANION GAP SERPL CALCULATED.3IONS-SCNC: 15 MMOL/L (ref 3–16)
BUN SERPL-MCNC: 10 MG/DL (ref 7–20)
CALCIUM SERPL-MCNC: 10.1 MG/DL (ref 8.3–10.6)
CHLORIDE SERPL-SCNC: 101 MMOL/L (ref 99–110)
CO2 SERPL-SCNC: 24 MMOL/L (ref 21–32)
CREAT SERPL-MCNC: 0.8 MG/DL (ref 0.6–1.2)
CREAT UR-MCNC: 41.1 MG/DL (ref 28–259)
EST. AVERAGE GLUCOSE BLD GHB EST-MCNC: 162.8 MG/DL
GFR SERPLBLD CREATININE-BSD FMLA CKD-EPI: 81 ML/MIN/{1.73_M2}
GLUCOSE SERPL-MCNC: 115 MG/DL (ref 70–99)
HBA1C MFR BLD: 7.3 %
MICROALBUMIN UR DL<=1MG/L-MCNC: <1.2 MG/DL
MICROALBUMIN/CREAT UR: NORMAL MG/G (ref 0–30)
POTASSIUM SERPL-SCNC: 3.5 MMOL/L (ref 3.5–5.1)
SODIUM SERPL-SCNC: 140 MMOL/L (ref 136–145)

## 2025-01-21 ENCOUNTER — HOSPITAL ENCOUNTER (OUTPATIENT)
Dept: WOMENS IMAGING | Age: 65
Discharge: HOME | End: 2025-01-21
Payer: MEDICARE

## 2025-01-21 VITALS — HEIGHT: 66 IN | BODY MASS INDEX: 30.53 KG/M2 | WEIGHT: 190 LBS

## 2025-01-21 DIAGNOSIS — Z12.31 SCREENING MAMMOGRAM FOR BREAST CANCER: ICD-10-CM

## 2025-01-21 PROCEDURE — 77067 SCR MAMMO BI INCL CAD: CPT

## 2025-01-22 ENCOUNTER — HOSPITAL ENCOUNTER (OUTPATIENT)
Dept: GENERAL RADIOLOGY | Age: 65
Discharge: HOME | End: 2025-01-22
Payer: MEDICARE

## 2025-01-22 DIAGNOSIS — Z78.0 POST-MENOPAUSAL: ICD-10-CM

## 2025-01-22 PROCEDURE — 77080 DXA BONE DENSITY AXIAL: CPT

## 2025-01-27 RX ORDER — AMLODIPINE BESYLATE 10 MG/1
TABLET ORAL
Qty: 90 TABLET | Refills: 0 | Status: SHIPPED | OUTPATIENT
Start: 2025-01-27

## 2025-01-28 RX ORDER — POTASSIUM CHLORIDE 600 MG/1
CAPSULE, EXTENDED RELEASE ORAL
Qty: 180 CAPSULE | Refills: 0 | Status: SHIPPED | OUTPATIENT
Start: 2025-01-28

## 2025-01-28 NOTE — TELEPHONE ENCOUNTER
Medication:   Requested Prescriptions     Pending Prescriptions Disp Refills    potassium chloride 8 MEQ CPCR [Pharmacy Med Name: POTASSIUM CL ER 8 MEQ CAPSULE] 180 capsule 0     Sig: TAKE 1 CAPSULE BY MOUTH TWICE A DAY     Last filled: 11/1/24  Last appt: 11/19/2024   Next appt: 3/13/2025    Last OARRS:        No data to display

## 2025-02-13 RX ORDER — EMPAGLIFLOZIN 10 MG/1
10 TABLET, FILM COATED ORAL DAILY
Qty: 30 TABLET | Refills: 2 | Status: SHIPPED | OUTPATIENT
Start: 2025-02-13

## 2025-02-13 NOTE — TELEPHONE ENCOUNTER
Medication:   Requested Prescriptions     Pending Prescriptions Disp Refills    JARDIANCE 10 MG tablet [Pharmacy Med Name: JARDIANCE 10 MG TABLET] 30 tablet 2     Sig: TAKE 1 TABLET BY MOUTH EVERY DAY     Last Filled:  11.22.4    Last appt: 11/19/2024   Next appt: 3/13/2025    Last Labs DM:   Lab Results   Component Value Date/Time    LABA1C 7.3 01/10/2025 10:37 AM

## 2025-03-06 DIAGNOSIS — E11.9 TYPE 2 DIABETES MELLITUS WITHOUT COMPLICATION, WITHOUT LONG-TERM CURRENT USE OF INSULIN (HCC): Primary | ICD-10-CM

## 2025-03-10 DIAGNOSIS — E11.9 TYPE 2 DIABETES MELLITUS WITHOUT COMPLICATION, WITHOUT LONG-TERM CURRENT USE OF INSULIN: ICD-10-CM

## 2025-03-10 LAB
ALBUMIN SERPL-MCNC: 4.5 G/DL (ref 3.4–5)
ALP SERPL-CCNC: 76 U/L (ref 40–129)
ALT SERPL-CCNC: 15 U/L (ref 10–40)
ANION GAP SERPL CALCULATED.3IONS-SCNC: 13 MMOL/L (ref 3–16)
AST SERPL-CCNC: 25 U/L (ref 15–37)
BASOPHILS # BLD: 0 K/UL (ref 0–0.2)
BASOPHILS NFR BLD: 0.8 %
BILIRUB DIRECT SERPL-MCNC: 0.3 MG/DL (ref 0–0.3)
BILIRUB INDIRECT SERPL-MCNC: 0.3 MG/DL (ref 0–1)
BILIRUB SERPL-MCNC: 0.6 MG/DL (ref 0–1)
BUN SERPL-MCNC: 14 MG/DL (ref 7–20)
CALCIUM SERPL-MCNC: 10 MG/DL (ref 8.3–10.6)
CHLORIDE SERPL-SCNC: 105 MMOL/L (ref 99–110)
CHOLEST SERPL-MCNC: 138 MG/DL (ref 0–199)
CO2 SERPL-SCNC: 23 MMOL/L (ref 21–32)
CREAT SERPL-MCNC: 0.8 MG/DL (ref 0.6–1.2)
DEPRECATED RDW RBC AUTO: 13.1 % (ref 12.4–15.4)
EOSINOPHIL # BLD: 0.1 K/UL (ref 0–0.6)
EOSINOPHIL NFR BLD: 1.4 %
EST. AVERAGE GLUCOSE BLD GHB EST-MCNC: 148.5 MG/DL
FOLATE SERPL-MCNC: 9.76 NG/ML (ref 4.78–24.2)
GFR SERPLBLD CREATININE-BSD FMLA CKD-EPI: 81 ML/MIN/{1.73_M2}
GLUCOSE SERPL-MCNC: 131 MG/DL (ref 70–99)
HBA1C MFR BLD: 6.8 %
HCT VFR BLD AUTO: 41.6 % (ref 36–48)
HDLC SERPL-MCNC: 61 MG/DL (ref 40–60)
HGB BLD-MCNC: 13.8 G/DL (ref 12–16)
LDLC SERPL CALC-MCNC: 66 MG/DL
LYMPHOCYTES # BLD: 1.2 K/UL (ref 1–5.1)
LYMPHOCYTES NFR BLD: 25.9 %
MCH RBC QN AUTO: 29.7 PG (ref 26–34)
MCHC RBC AUTO-ENTMCNC: 33.3 G/DL (ref 31–36)
MCV RBC AUTO: 89.3 FL (ref 80–100)
MONOCYTES # BLD: 0.3 K/UL (ref 0–1.3)
MONOCYTES NFR BLD: 6.3 %
NEUTROPHILS # BLD: 2.9 K/UL (ref 1.7–7.7)
NEUTROPHILS NFR BLD: 65.6 %
PLATELET # BLD AUTO: 228 K/UL (ref 135–450)
PMV BLD AUTO: 9.9 FL (ref 5–10.5)
POTASSIUM SERPL-SCNC: 4 MMOL/L (ref 3.5–5.1)
PROT SERPL-MCNC: 7.7 G/DL (ref 6.4–8.2)
RBC # BLD AUTO: 4.66 M/UL (ref 4–5.2)
SODIUM SERPL-SCNC: 141 MMOL/L (ref 136–145)
TRIGL SERPL-MCNC: 56 MG/DL (ref 0–150)
TSH SERPL DL<=0.005 MIU/L-ACNC: 1.92 UIU/ML (ref 0.27–4.2)
VIT B12 SERPL-MCNC: 608 PG/ML (ref 211–911)
VLDLC SERPL CALC-MCNC: 11 MG/DL
WBC # BLD AUTO: 4.5 K/UL (ref 4–11)

## 2025-03-10 SDOH — ECONOMIC STABILITY: FOOD INSECURITY: WITHIN THE PAST 12 MONTHS, THE FOOD YOU BOUGHT JUST DIDN'T LAST AND YOU DIDN'T HAVE MONEY TO GET MORE.: NEVER TRUE

## 2025-03-10 SDOH — ECONOMIC STABILITY: INCOME INSECURITY: IN THE LAST 12 MONTHS, WAS THERE A TIME WHEN YOU WERE NOT ABLE TO PAY THE MORTGAGE OR RENT ON TIME?: NO

## 2025-03-10 SDOH — ECONOMIC STABILITY: FOOD INSECURITY: WITHIN THE PAST 12 MONTHS, YOU WORRIED THAT YOUR FOOD WOULD RUN OUT BEFORE YOU GOT MONEY TO BUY MORE.: NEVER TRUE

## 2025-03-10 ASSESSMENT — PATIENT HEALTH QUESTIONNAIRE - PHQ9
3. TROUBLE FALLING OR STAYING ASLEEP: NOT AT ALL
6. FEELING BAD ABOUT YOURSELF - OR THAT YOU ARE A FAILURE OR HAVE LET YOURSELF OR YOUR FAMILY DOWN: NOT AT ALL
10. IF YOU CHECKED OFF ANY PROBLEMS, HOW DIFFICULT HAVE THESE PROBLEMS MADE IT FOR YOU TO DO YOUR WORK, TAKE CARE OF THINGS AT HOME, OR GET ALONG WITH OTHER PEOPLE: NOT DIFFICULT AT ALL
5. POOR APPETITE OR OVEREATING: NOT AT ALL
SUM OF ALL RESPONSES TO PHQ QUESTIONS 1-9: 0
1. LITTLE INTEREST OR PLEASURE IN DOING THINGS: NOT AT ALL
1. LITTLE INTEREST OR PLEASURE IN DOING THINGS: NOT AT ALL
10. IF YOU CHECKED OFF ANY PROBLEMS, HOW DIFFICULT HAVE THESE PROBLEMS MADE IT FOR YOU TO DO YOUR WORK, TAKE CARE OF THINGS AT HOME, OR GET ALONG WITH OTHER PEOPLE: NOT DIFFICULT AT ALL
SUM OF ALL RESPONSES TO PHQ QUESTIONS 1-9: 0
2. FEELING DOWN, DEPRESSED OR HOPELESS: NOT AT ALL
SUM OF ALL RESPONSES TO PHQ QUESTIONS 1-9: 0
2. FEELING DOWN, DEPRESSED OR HOPELESS: NOT AT ALL
3. TROUBLE FALLING OR STAYING ASLEEP: NOT AT ALL
SUM OF ALL RESPONSES TO PHQ QUESTIONS 1-9: 0
4. FEELING TIRED OR HAVING LITTLE ENERGY: NOT AT ALL
8. MOVING OR SPEAKING SO SLOWLY THAT OTHER PEOPLE COULD HAVE NOTICED. OR THE OPPOSITE - BEING SO FIDGETY OR RESTLESS THAT YOU HAVE BEEN MOVING AROUND A LOT MORE THAN USUAL: NOT AT ALL
SUM OF ALL RESPONSES TO PHQ QUESTIONS 1-9: 0
6. FEELING BAD ABOUT YOURSELF - OR THAT YOU ARE A FAILURE OR HAVE LET YOURSELF OR YOUR FAMILY DOWN: NOT AT ALL
7. TROUBLE CONCENTRATING ON THINGS, SUCH AS READING THE NEWSPAPER OR WATCHING TELEVISION: NOT AT ALL
9. THOUGHTS THAT YOU WOULD BE BETTER OFF DEAD, OR OF HURTING YOURSELF: NOT AT ALL
9. THOUGHTS THAT YOU WOULD BE BETTER OFF DEAD, OR OF HURTING YOURSELF: NOT AT ALL
8. MOVING OR SPEAKING SO SLOWLY THAT OTHER PEOPLE COULD HAVE NOTICED. OR THE OPPOSITE, BEING SO FIGETY OR RESTLESS THAT YOU HAVE BEEN MOVING AROUND A LOT MORE THAN USUAL: NOT AT ALL
5. POOR APPETITE OR OVEREATING: NOT AT ALL
4. FEELING TIRED OR HAVING LITTLE ENERGY: NOT AT ALL
7. TROUBLE CONCENTRATING ON THINGS, SUCH AS READING THE NEWSPAPER OR WATCHING TELEVISION: NOT AT ALL

## 2025-03-13 ENCOUNTER — OFFICE VISIT (OUTPATIENT)
Dept: PRIMARY CARE CLINIC | Age: 65
End: 2025-03-13
Payer: MEDICARE

## 2025-03-13 ENCOUNTER — RESULTS FOLLOW-UP (OUTPATIENT)
Dept: PRIMARY CARE CLINIC | Age: 65
End: 2025-03-13

## 2025-03-13 VITALS
WEIGHT: 172 LBS | HEART RATE: 88 BPM | HEIGHT: 66 IN | BODY MASS INDEX: 27.64 KG/M2 | OXYGEN SATURATION: 99 % | DIASTOLIC BLOOD PRESSURE: 62 MMHG | SYSTOLIC BLOOD PRESSURE: 100 MMHG

## 2025-03-13 DIAGNOSIS — E78.2 MIXED HYPERLIPIDEMIA: ICD-10-CM

## 2025-03-13 DIAGNOSIS — I10 HYPERTENSION, BENIGN: ICD-10-CM

## 2025-03-13 DIAGNOSIS — G62.9 NEUROPATHY: ICD-10-CM

## 2025-03-13 DIAGNOSIS — Z23 ENCOUNTER FOR PREVNAR PNEUMOCOCCAL VACCINATION: ICD-10-CM

## 2025-03-13 DIAGNOSIS — E11.9 TYPE 2 DIABETES MELLITUS WITHOUT COMPLICATION, WITHOUT LONG-TERM CURRENT USE OF INSULIN (HCC): Primary | ICD-10-CM

## 2025-03-13 PROCEDURE — 99214 OFFICE O/P EST MOD 30 MIN: CPT | Performed by: FAMILY MEDICINE

## 2025-03-13 PROCEDURE — 3074F SYST BP LT 130 MM HG: CPT | Performed by: FAMILY MEDICINE

## 2025-03-13 PROCEDURE — G2211 COMPLEX E/M VISIT ADD ON: HCPCS | Performed by: FAMILY MEDICINE

## 2025-03-13 PROCEDURE — 1123F ACP DISCUSS/DSCN MKR DOCD: CPT | Performed by: FAMILY MEDICINE

## 2025-03-13 PROCEDURE — 3044F HG A1C LEVEL LT 7.0%: CPT | Performed by: FAMILY MEDICINE

## 2025-03-13 PROCEDURE — G0009 ADMIN PNEUMOCOCCAL VACCINE: HCPCS | Performed by: FAMILY MEDICINE

## 2025-03-13 PROCEDURE — 3078F DIAST BP <80 MM HG: CPT | Performed by: FAMILY MEDICINE

## 2025-03-13 PROCEDURE — 90677 PCV20 VACCINE IM: CPT | Performed by: FAMILY MEDICINE

## 2025-03-13 RX ORDER — METFORMIN HYDROCHLORIDE 500 MG/1
1000 TABLET, EXTENDED RELEASE ORAL
Qty: 180 TABLET | Refills: 1 | Status: SHIPPED | OUTPATIENT
Start: 2025-03-13

## 2025-04-25 RX ORDER — AMLODIPINE BESYLATE 10 MG/1
10 TABLET ORAL DAILY
Qty: 30 TABLET | Refills: 0 | Status: SHIPPED | OUTPATIENT
Start: 2025-04-25

## 2025-04-25 RX ORDER — ATORVASTATIN CALCIUM 10 MG/1
10 TABLET, FILM COATED ORAL DAILY
Qty: 30 TABLET | Refills: 0 | Status: SHIPPED | OUTPATIENT
Start: 2025-04-25

## 2025-04-25 NOTE — TELEPHONE ENCOUNTER
Medication:   Requested Prescriptions     Pending Prescriptions Disp Refills    atorvastatin (LIPITOR) 10 MG tablet [Pharmacy Med Name: ATORVASTATIN 10 MG TABLET] 90 tablet 1     Sig: TAKE 1 TABLET BY MOUTH EVERY DAY     Last Filled:  10/30/2024    Last appt: 3/13/2025   Next appt: 4/25/2025    Last Lipid:   Lab Results   Component Value Date/Time    CHOL 138 03/10/2025 10:04 AM    TRIG 56 03/10/2025 10:04 AM    HDL 61 03/10/2025 10:04 AM

## 2025-04-28 RX ORDER — POTASSIUM CHLORIDE 600 MG/1
1 CAPSULE, EXTENDED RELEASE ORAL 2 TIMES DAILY
Qty: 180 CAPSULE | Refills: 0 | Status: SHIPPED | OUTPATIENT
Start: 2025-04-28

## 2025-04-28 NOTE — TELEPHONE ENCOUNTER
Medication:   Requested Prescriptions     Pending Prescriptions Disp Refills    potassium chloride 8 MEQ CPCR [Pharmacy Med Name: POTASSIUM CL ER 8 MEQ CAPSULE] 180 capsule 0     Sig: TAKE 1 CAPSULE BY MOUTH TWICE A DAY     Last Filled:  01/28/25    Last appt: 3/13/2025   Next appt: 9/18/2025    Last Labs DM:   Lab Results   Component Value Date/Time    LABA1C 6.8 03/10/2025 10:04 AM     Last Lipid:   Lab Results   Component Value Date/Time    CHOL 138 03/10/2025 10:04 AM    TRIG 56 03/10/2025 10:04 AM    HDL 61 03/10/2025 10:04 AM     Last PSA: No results found for: \"PSA\"  Last Thyroid:   Lab Results   Component Value Date/Time    TSH 1.92 03/10/2025 10:04 AM

## 2025-05-07 RX ORDER — EMPAGLIFLOZIN 10 MG/1
10 TABLET, FILM COATED ORAL DAILY
Qty: 30 TABLET | Refills: 2 | OUTPATIENT
Start: 2025-05-07

## 2025-05-07 NOTE — TELEPHONE ENCOUNTER
Medication:   Requested Prescriptions     Pending Prescriptions Disp Refills    JARDIANCE 10 MG tablet [Pharmacy Med Name: JARDIANCE 10 MG TABLET] 30 tablet 2     Sig: TAKE 1 TABLET BY MOUTH EVERY DAY     Last Filled:      Last appt: 3/13/2025   Next appt: 9/18/2025    Last Labs DM:   Lab Results   Component Value Date/Time    LABA1C 6.8 03/10/2025 10:04 AM

## 2025-05-12 RX ORDER — METFORMIN HYDROCHLORIDE 500 MG/1
1000 TABLET, EXTENDED RELEASE ORAL 2 TIMES DAILY
Qty: 360 TABLET | Refills: 0 | Status: SHIPPED | OUTPATIENT
Start: 2025-05-12

## 2025-05-12 NOTE — TELEPHONE ENCOUNTER
Medication:   Requested Prescriptions     Pending Prescriptions Disp Refills    metFORMIN (GLUCOPHAGE-XR) 500 MG extended release tablet [Pharmacy Med Name: METFORMIN HCL  MG TABLET] 360 tablet      Sig: TAKE 2 TABLETS BY MOUTH IN THE MORNING AND AT BEDTIME     Last Filled:  3/13/25    Last appt: 3/13/2025   Next appt: 9/18/2025    Last Labs DM:   Lab Results   Component Value Date/Time    LABA1C 6.8 03/10/2025 10:04 AM

## 2025-05-21 RX ORDER — AMLODIPINE BESYLATE 10 MG/1
10 TABLET ORAL DAILY
Qty: 90 TABLET | Refills: 1 | Status: SHIPPED | OUTPATIENT
Start: 2025-05-21

## 2025-05-21 RX ORDER — ATORVASTATIN CALCIUM 10 MG/1
10 TABLET, FILM COATED ORAL DAILY
Qty: 90 TABLET | Refills: 1 | Status: SHIPPED | OUTPATIENT
Start: 2025-05-21

## 2025-05-21 NOTE — TELEPHONE ENCOUNTER
Medication:   Requested Prescriptions     Pending Prescriptions Disp Refills    atorvastatin (LIPITOR) 10 MG tablet [Pharmacy Med Name: ATORVASTATIN 10 MG TABLET] 90 tablet 1     Sig: TAKE 1 TABLET BY MOUTH EVERY DAY     Last Filled:  4/25/25    Last appt: 3/13/2025   Next appt: 9/18/2025    Last Lipid:   Lab Results   Component Value Date/Time    CHOL 138 03/10/2025 10:04 AM    TRIG 56 03/10/2025 10:04 AM    HDL 61 03/10/2025 10:04 AM

## 2025-05-21 NOTE — TELEPHONE ENCOUNTER
Medication:   Requested Prescriptions     Pending Prescriptions Disp Refills    amLODIPine (NORVASC) 10 MG tablet [Pharmacy Med Name: AMLODIPINE BESYLATE 10 MG TAB] 90 tablet 1     Sig: TAKE 1 TABLET BY MOUTH EVERY DAY     Last filled: 4/25/25  Last appt: 3/13/2025   Next appt: 9/18/2025    Last OARRS:        No data to display

## 2025-07-28 RX ORDER — POTASSIUM CHLORIDE 600 MG/1
1 CAPSULE, EXTENDED RELEASE ORAL 2 TIMES DAILY
Qty: 180 CAPSULE | Refills: 0 | Status: SHIPPED | OUTPATIENT
Start: 2025-07-28